# Patient Record
Sex: FEMALE | Race: WHITE | Employment: OTHER | URBAN - NONMETROPOLITAN AREA
[De-identification: names, ages, dates, MRNs, and addresses within clinical notes are randomized per-mention and may not be internally consistent; named-entity substitution may affect disease eponyms.]

---

## 2017-09-27 ENCOUNTER — HOSPITAL ENCOUNTER (OUTPATIENT)
Dept: WOMENS IMAGING | Age: 67
Discharge: HOME | End: 2017-09-27
Payer: MEDICARE

## 2017-09-27 DIAGNOSIS — Z12.39 SCREENING BREAST EXAMINATION: ICD-10-CM

## 2017-09-27 PROCEDURE — G0202 SCR MAMMO BI INCL CAD: HCPCS

## 2018-07-29 PROBLEM — H25.012 CORTICAL AGE-RELATED CATARACT, LEFT EYE: Chronic | Status: ACTIVE | Noted: 2018-07-29

## 2018-07-30 ENCOUNTER — HOSPITAL ENCOUNTER (OUTPATIENT)
Age: 68
Setting detail: OUTPATIENT SURGERY
Discharge: HOME | End: 2018-07-30
Attending: OPHTHALMOLOGY | Admitting: OPHTHALMOLOGY
Payer: MEDICARE

## 2018-07-30 VITALS
DIASTOLIC BLOOD PRESSURE: 76 MMHG | HEART RATE: 66 BPM | RESPIRATION RATE: 20 BRPM | BODY MASS INDEX: 33.97 KG/M2 | TEMPERATURE: 97 F | HEIGHT: 64 IN | SYSTOLIC BLOOD PRESSURE: 150 MMHG | WEIGHT: 199 LBS | OXYGEN SATURATION: 98 %

## 2018-07-30 PROBLEM — H25.012 CORTICAL AGE-RELATED CATARACT, LEFT EYE: Chronic | Status: RESOLVED | Noted: 2018-07-29 | Resolved: 2018-07-30

## 2018-07-30 PROCEDURE — 2580000003 HC RX 258: Performed by: OPHTHALMOLOGY

## 2018-07-30 PROCEDURE — 99152 MOD SED SAME PHYS/QHP 5/>YRS: CPT | Performed by: OPHTHALMOLOGY

## 2018-07-30 PROCEDURE — 6370000000 HC RX 637 (ALT 250 FOR IP): Performed by: OPHTHALMOLOGY

## 2018-07-30 PROCEDURE — 3600000003 HC SURGERY LEVEL 3 BASE: Performed by: OPHTHALMOLOGY

## 2018-07-30 PROCEDURE — 6360000002 HC RX W HCPCS

## 2018-07-30 PROCEDURE — 7100000010 HC PHASE II RECOVERY - FIRST 15 MIN: Performed by: OPHTHALMOLOGY

## 2018-07-30 PROCEDURE — 6360000002 HC RX W HCPCS: Performed by: OPHTHALMOLOGY

## 2018-07-30 PROCEDURE — V2632 POST CHMBR INTRAOCULAR LENS: HCPCS | Performed by: OPHTHALMOLOGY

## 2018-07-30 PROCEDURE — 2500000003 HC RX 250 WO HCPCS: Performed by: OPHTHALMOLOGY

## 2018-07-30 PROCEDURE — 99153 MOD SED SAME PHYS/QHP EA: CPT | Performed by: OPHTHALMOLOGY

## 2018-07-30 PROCEDURE — 2709999900 HC NON-CHARGEABLE SUPPLY: Performed by: OPHTHALMOLOGY

## 2018-07-30 PROCEDURE — 2500000003 HC RX 250 WO HCPCS

## 2018-07-30 PROCEDURE — 3600000013 HC SURGERY LEVEL 3 ADDTL 15MIN: Performed by: OPHTHALMOLOGY

## 2018-07-30 DEVICE — LENS INTOCU +20 DIOPT L12.5MM DIA6MM 119.1 OPTICAL/118.7 A: Type: IMPLANTABLE DEVICE | Site: EYE | Status: FUNCTIONAL

## 2018-07-30 RX ORDER — ACETAMINOPHEN 325 MG/1
650 TABLET ORAL EVERY 4 HOURS PRN
Status: CANCELLED | OUTPATIENT
Start: 2018-07-30

## 2018-07-30 RX ORDER — SODIUM CHLORIDE 0.9 % (FLUSH) 0.9 %
10 SYRINGE (ML) INJECTION EVERY 12 HOURS SCHEDULED
Status: CANCELLED | OUTPATIENT
Start: 2018-07-30

## 2018-07-30 RX ORDER — PHENYLEPHRINE HCL 2.5 %
1 DROPS OPHTHALMIC (EYE) SEE ADMIN INSTRUCTIONS
Status: DISCONTINUED | OUTPATIENT
Start: 2018-07-30 | End: 2018-07-30 | Stop reason: HOSPADM

## 2018-07-30 RX ORDER — SODIUM CHLORIDE 0.9 % (FLUSH) 0.9 %
10 SYRINGE (ML) INJECTION EVERY 12 HOURS SCHEDULED
Status: DISCONTINUED | OUTPATIENT
Start: 2018-07-30 | End: 2018-07-30 | Stop reason: HOSPADM

## 2018-07-30 RX ORDER — FENTANYL CITRATE 50 UG/ML
INJECTION, SOLUTION INTRAMUSCULAR; INTRAVENOUS PRN
Status: DISCONTINUED | OUTPATIENT
Start: 2018-07-30 | End: 2018-07-30 | Stop reason: HOSPADM

## 2018-07-30 RX ORDER — PROPARACAINE HYDROCHLORIDE 5 MG/ML
1 SOLUTION/ DROPS OPHTHALMIC SEE ADMIN INSTRUCTIONS
Status: DISCONTINUED | OUTPATIENT
Start: 2018-07-30 | End: 2018-07-30 | Stop reason: HOSPADM

## 2018-07-30 RX ORDER — ONDANSETRON 2 MG/ML
4 INJECTION INTRAMUSCULAR; INTRAVENOUS EVERY 6 HOURS PRN
Status: CANCELLED | OUTPATIENT
Start: 2018-07-30

## 2018-07-30 RX ORDER — LIDOCAINE HYDROCHLORIDE 10 MG/ML
INJECTION, SOLUTION EPIDURAL; INFILTRATION; INTRACAUDAL; PERINEURAL PRN
Status: DISCONTINUED | OUTPATIENT
Start: 2018-07-30 | End: 2018-07-30 | Stop reason: HOSPADM

## 2018-07-30 RX ORDER — ATORVASTATIN CALCIUM 20 MG/1
20 TABLET, FILM COATED ORAL DAILY
COMMUNITY

## 2018-07-30 RX ORDER — SODIUM CHLORIDE 0.9 % (FLUSH) 0.9 %
10 SYRINGE (ML) INJECTION PRN
Status: DISCONTINUED | OUTPATIENT
Start: 2018-07-30 | End: 2018-07-30 | Stop reason: HOSPADM

## 2018-07-30 RX ORDER — SODIUM CHLORIDE 9 MG/ML
INJECTION, SOLUTION INTRAVENOUS CONTINUOUS
Status: DISCONTINUED | OUTPATIENT
Start: 2018-07-30 | End: 2018-07-30 | Stop reason: HOSPADM

## 2018-07-30 RX ORDER — TETRACAINE HYDROCHLORIDE 5 MG/ML
1 SOLUTION OPHTHALMIC SEE ADMIN INSTRUCTIONS
Status: DISCONTINUED | OUTPATIENT
Start: 2018-07-30 | End: 2018-07-30 | Stop reason: HOSPADM

## 2018-07-30 RX ORDER — SODIUM CHLORIDE 0.9 % (FLUSH) 0.9 %
10 SYRINGE (ML) INJECTION PRN
Status: CANCELLED | OUTPATIENT
Start: 2018-07-30

## 2018-07-30 RX ORDER — TROPICAMIDE 10 MG/ML
1 SOLUTION/ DROPS OPHTHALMIC SEE ADMIN INSTRUCTIONS
Status: DISCONTINUED | OUTPATIENT
Start: 2018-07-30 | End: 2018-07-30 | Stop reason: HOSPADM

## 2018-07-30 RX ORDER — MIDAZOLAM HYDROCHLORIDE 1 MG/ML
INJECTION INTRAMUSCULAR; INTRAVENOUS PRN
Status: DISCONTINUED | OUTPATIENT
Start: 2018-07-30 | End: 2018-07-30 | Stop reason: HOSPADM

## 2018-07-30 RX ADMIN — SODIUM CHLORIDE: 9 INJECTION, SOLUTION INTRAVENOUS at 14:13

## 2018-07-30 RX ADMIN — PROPARACAINE HYDROCHLORIDE 1 DROP: 5 SOLUTION/ DROPS OPHTHALMIC at 14:20

## 2018-07-30 RX ADMIN — TROPICAMIDE 1 DROP: 10 SOLUTION/ DROPS OPHTHALMIC at 14:10

## 2018-07-30 RX ADMIN — TROPICAMIDE 1 DROP: 10 SOLUTION/ DROPS OPHTHALMIC at 14:00

## 2018-07-30 RX ADMIN — PHENYLEPHRINE HYDROCHLORIDE 1 DROP: 25 SOLUTION/ DROPS OPHTHALMIC at 14:20

## 2018-07-30 RX ADMIN — PROPARACAINE HYDROCHLORIDE 1 DROP: 5 SOLUTION/ DROPS OPHTHALMIC at 14:00

## 2018-07-30 RX ADMIN — PROPARACAINE HYDROCHLORIDE 1 DROP: 5 SOLUTION/ DROPS OPHTHALMIC at 14:10

## 2018-07-30 RX ADMIN — TROPICAMIDE 1 DROP: 10 SOLUTION/ DROPS OPHTHALMIC at 14:20

## 2018-07-30 RX ADMIN — PHENYLEPHRINE HYDROCHLORIDE 1 DROP: 25 SOLUTION/ DROPS OPHTHALMIC at 14:00

## 2018-07-30 RX ADMIN — PHENYLEPHRINE HYDROCHLORIDE 1 DROP: 25 SOLUTION/ DROPS OPHTHALMIC at 14:10

## 2018-07-30 RX ADMIN — TETRACAINE HYDROCHLORIDE 1 DROP: 25 LIQUID OPHTHALMIC at 14:35

## 2018-07-30 ASSESSMENT — PAIN - FUNCTIONAL ASSESSMENT: PAIN_FUNCTIONAL_ASSESSMENT: 0-10

## 2018-07-30 ASSESSMENT — PAIN SCALES - GENERAL
PAINLEVEL_OUTOF10: 0
PAINLEVEL_OUTOF10: 0

## 2018-07-30 NOTE — OP NOTE
OPERATIVE NOTE      Patient:  Mary Bermudez:  [birth year]    Account #:  [de-identified]    Date:  7/30/2018    Surgeon:  Carmelo Ramírez. 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 year old with decreased vision, glare and halos around lights, and trouble with activities of daily living. Examination revealed a visually significant cataract in the left eye. Other eye diseases have been ruled out as the primary cause of decreased visual function. Significant improvement is expected in the patient's visual acuity and/or visual function from the removal of the cataract. Risks, benefits, and alternatives to surgery were discussed with the patient and the patient elected to proceed with phacoemulsification with lens implantation. Details of the procedure: Following informed consent, the patient was identified by name and identification tag in the holding area,taken to the operating room and placed in the supine position. A time out was performed by all present in the room to identify the patient, the eye to be operated on, the correct operative procedure, and the correct intraocular lens. Monitoring leads were placed. The patient was positioned. An eyelid prep of half-strength Betadine was performed. The patient was administered intravenous sedation if desired and topical anesthetic was placed in the operative eye. The eye prepped a second time and draped in the usual sterile fashion using aseptic technique for cataract surgery. A lid speculum was then inserted. [place] was placed onto the corneal surface.   A stab incision was made using a disposable blade into the anterior chamber. Intracameral preservative free xylocaine was placed into the anterior chamber. Amvisc was then used to replace the aqueous of the anterior chamber. A 2.2 mm keratome blade was used to make a 3-plane clear corneal incision into cornea depending on the patient's astigmatism. The cystitome was used to make a tear in the anterior capsule. Fine forceps were used to make a complete curvilinear capsulorrhexis. The lens was hydrodissected and freely rotated using a balanced salt filled syringe. The ultrasound handpiece was then used to emulsify the nucleus and epi nucleus. The residual cortex was then aspirated using the IA handpiece. The posterior capsule was polished. The eye was filled with viscoelastic and a foldable posterior chamber intraocular lens was injected into the capsular bag unless otherwise stated in the addendum. Irrigation/aspiration was used to remove all excess viscoelastic. The eye was pressurized and the wounds were check for leaks and none were found. A collagen shield, which had been soaked in antibiotic drops, was then placed onto the cornea. The lid speculum was removed. The eye was covered with a clear plastic shield. The patient was taken to the recovery area in excellent condition, having tolerated the procedure well, and will follow up with me tomorrow for postop care. ADDENDUM:  The phacoemulsification time 20.57 seconds @ 8% average ultrasound power for an effective phacoemulsification time of 1.74 seconds. The lens inserted was a model MX60 made by [facility] [employer] that was +20.0 diopters in power. The lens was inserted into the capsular bag utilizing the BLIS-X1 injector made by [employer] [employer].  The serial number on this lens was [id number]. There was no stitch placed to close this temporal posterior corneal incision. EBL was less than 1.0 ml. Carmelo Ramírez.  Cade Loving DO

## 2018-08-19 PROBLEM — H25.011 CORTICAL AGE-RELATED CATARACT OF RIGHT EYE: Chronic | Status: ACTIVE | Noted: 2018-08-19

## 2018-08-20 ENCOUNTER — HOSPITAL ENCOUNTER (OUTPATIENT)
Age: 68
Setting detail: OUTPATIENT SURGERY
Discharge: HOME | End: 2018-08-20
Attending: OPHTHALMOLOGY | Admitting: OPHTHALMOLOGY
Payer: MEDICARE

## 2018-08-20 VITALS
DIASTOLIC BLOOD PRESSURE: 66 MMHG | TEMPERATURE: 97 F | HEART RATE: 69 BPM | BODY MASS INDEX: 34.15 KG/M2 | SYSTOLIC BLOOD PRESSURE: 157 MMHG | WEIGHT: 200 LBS | HEIGHT: 64 IN | OXYGEN SATURATION: 99 % | RESPIRATION RATE: 18 BRPM

## 2018-08-20 PROBLEM — H25.011 CORTICAL AGE-RELATED CATARACT OF RIGHT EYE: Chronic | Status: RESOLVED | Noted: 2018-08-19 | Resolved: 2018-08-20

## 2018-08-20 PROCEDURE — 2709999900 HC NON-CHARGEABLE SUPPLY: Performed by: OPHTHALMOLOGY

## 2018-08-20 PROCEDURE — 6360000002 HC RX W HCPCS: Performed by: OPHTHALMOLOGY

## 2018-08-20 PROCEDURE — V2632 POST CHMBR INTRAOCULAR LENS: HCPCS | Performed by: OPHTHALMOLOGY

## 2018-08-20 PROCEDURE — 3600000003 HC SURGERY LEVEL 3 BASE: Performed by: OPHTHALMOLOGY

## 2018-08-20 PROCEDURE — 6370000000 HC RX 637 (ALT 250 FOR IP): Performed by: OPHTHALMOLOGY

## 2018-08-20 PROCEDURE — 2500000003 HC RX 250 WO HCPCS: Performed by: OPHTHALMOLOGY

## 2018-08-20 PROCEDURE — 2580000003 HC RX 258: Performed by: OPHTHALMOLOGY

## 2018-08-20 PROCEDURE — 99153 MOD SED SAME PHYS/QHP EA: CPT | Performed by: OPHTHALMOLOGY

## 2018-08-20 PROCEDURE — 3600000013 HC SURGERY LEVEL 3 ADDTL 15MIN: Performed by: OPHTHALMOLOGY

## 2018-08-20 PROCEDURE — 99152 MOD SED SAME PHYS/QHP 5/>YRS: CPT | Performed by: OPHTHALMOLOGY

## 2018-08-20 PROCEDURE — 7100000011 HC PHASE II RECOVERY - ADDTL 15 MIN: Performed by: OPHTHALMOLOGY

## 2018-08-20 PROCEDURE — 7100000010 HC PHASE II RECOVERY - FIRST 15 MIN: Performed by: OPHTHALMOLOGY

## 2018-08-20 DEVICE — LENS INTRAOCULAR BCNVX 16.5+ DIOPT 6X12.5 MM ACRYL ENVISTA: Type: IMPLANTABLE DEVICE | Site: EYE | Status: FUNCTIONAL

## 2018-08-20 RX ORDER — ONDANSETRON 2 MG/ML
4 INJECTION INTRAMUSCULAR; INTRAVENOUS EVERY 6 HOURS PRN
Status: DISCONTINUED | OUTPATIENT
Start: 2018-08-20 | End: 2018-08-20 | Stop reason: HOSPADM

## 2018-08-20 RX ORDER — ACETAMINOPHEN 325 MG/1
650 TABLET ORAL EVERY 4 HOURS PRN
Status: DISCONTINUED | OUTPATIENT
Start: 2018-08-20 | End: 2018-08-20 | Stop reason: HOSPADM

## 2018-08-20 RX ORDER — SODIUM CHLORIDE 0.9 % (FLUSH) 0.9 %
10 SYRINGE (ML) INJECTION EVERY 12 HOURS SCHEDULED
Status: DISCONTINUED | OUTPATIENT
Start: 2018-08-20 | End: 2018-08-20 | Stop reason: HOSPADM

## 2018-08-20 RX ORDER — TROPICAMIDE 10 MG/ML
1 SOLUTION/ DROPS OPHTHALMIC SEE ADMIN INSTRUCTIONS
Status: DISCONTINUED | OUTPATIENT
Start: 2018-08-20 | End: 2018-08-20 | Stop reason: HOSPADM

## 2018-08-20 RX ORDER — TETRACAINE HYDROCHLORIDE 5 MG/ML
SOLUTION OPHTHALMIC PRN
Status: DISCONTINUED | OUTPATIENT
Start: 2018-08-20 | End: 2018-08-20 | Stop reason: HOSPADM

## 2018-08-20 RX ORDER — TETRACAINE HYDROCHLORIDE 5 MG/ML
1 SOLUTION OPHTHALMIC SEE ADMIN INSTRUCTIONS
Status: DISCONTINUED | OUTPATIENT
Start: 2018-08-20 | End: 2018-08-20 | Stop reason: HOSPADM

## 2018-08-20 RX ORDER — SODIUM CHLORIDE 0.9 % (FLUSH) 0.9 %
10 SYRINGE (ML) INJECTION PRN
Status: DISCONTINUED | OUTPATIENT
Start: 2018-08-20 | End: 2018-08-20 | Stop reason: HOSPADM

## 2018-08-20 RX ORDER — PROPARACAINE HYDROCHLORIDE 5 MG/ML
1 SOLUTION/ DROPS OPHTHALMIC SEE ADMIN INSTRUCTIONS
Status: DISCONTINUED | OUTPATIENT
Start: 2018-08-20 | End: 2018-08-20 | Stop reason: HOSPADM

## 2018-08-20 RX ORDER — MIDAZOLAM HYDROCHLORIDE 1 MG/ML
INJECTION INTRAMUSCULAR; INTRAVENOUS PRN
Status: DISCONTINUED | OUTPATIENT
Start: 2018-08-20 | End: 2018-08-20 | Stop reason: HOSPADM

## 2018-08-20 RX ORDER — SODIUM CHLORIDE 9 MG/ML
INJECTION, SOLUTION INTRAVENOUS CONTINUOUS
Status: DISCONTINUED | OUTPATIENT
Start: 2018-08-20 | End: 2018-08-20 | Stop reason: HOSPADM

## 2018-08-20 RX ORDER — FENTANYL CITRATE 50 UG/ML
INJECTION, SOLUTION INTRAMUSCULAR; INTRAVENOUS PRN
Status: DISCONTINUED | OUTPATIENT
Start: 2018-08-20 | End: 2018-08-20 | Stop reason: HOSPADM

## 2018-08-20 RX ORDER — PHENYLEPHRINE HCL 2.5 %
1 DROPS OPHTHALMIC (EYE) SEE ADMIN INSTRUCTIONS
Status: DISCONTINUED | OUTPATIENT
Start: 2018-08-20 | End: 2018-08-20 | Stop reason: HOSPADM

## 2018-08-20 RX ORDER — LIDOCAINE HYDROCHLORIDE 10 MG/ML
INJECTION, SOLUTION EPIDURAL; INFILTRATION; INTRACAUDAL; PERINEURAL PRN
Status: DISCONTINUED | OUTPATIENT
Start: 2018-08-20 | End: 2018-08-20 | Stop reason: HOSPADM

## 2018-08-20 RX ADMIN — PHENYLEPHRINE HYDROCHLORIDE 1 DROP: 25 SOLUTION/ DROPS OPHTHALMIC at 13:15

## 2018-08-20 RX ADMIN — PROPARACAINE HYDROCHLORIDE 1 DROP: 5 SOLUTION/ DROPS OPHTHALMIC at 13:15

## 2018-08-20 RX ADMIN — TROPICAMIDE 1 DROP: 10 SOLUTION/ DROPS OPHTHALMIC at 13:15

## 2018-08-20 RX ADMIN — TROPICAMIDE 1 DROP: 10 SOLUTION/ DROPS OPHTHALMIC at 13:25

## 2018-08-20 RX ADMIN — PROPARACAINE HYDROCHLORIDE 1 DROP: 5 SOLUTION/ DROPS OPHTHALMIC at 13:25

## 2018-08-20 RX ADMIN — PHENYLEPHRINE HYDROCHLORIDE 1 DROP: 25 SOLUTION/ DROPS OPHTHALMIC at 13:25

## 2018-08-20 RX ADMIN — TETRACAINE HYDROCHLORIDE 1 DROP: 25 LIQUID OPHTHALMIC at 13:38

## 2018-08-20 RX ADMIN — SODIUM CHLORIDE: 9 INJECTION, SOLUTION INTRAVENOUS at 13:22

## 2018-08-20 RX ADMIN — PHENYLEPHRINE HYDROCHLORIDE 1 DROP: 25 SOLUTION/ DROPS OPHTHALMIC at 13:20

## 2018-08-20 RX ADMIN — TROPICAMIDE 1 DROP: 10 SOLUTION/ DROPS OPHTHALMIC at 13:20

## 2018-08-20 RX ADMIN — PROPARACAINE HYDROCHLORIDE 1 DROP: 5 SOLUTION/ DROPS OPHTHALMIC at 13:20

## 2018-08-20 ASSESSMENT — PAIN SCALES - GENERAL
PAINLEVEL_OUTOF10: 0

## 2018-08-20 ASSESSMENT — PAIN - FUNCTIONAL ASSESSMENT: PAIN_FUNCTIONAL_ASSESSMENT: 0-10

## 2018-08-20 NOTE — H&P
I have examined the patient and reviewed the history and physical completed on 8/19/18 and find no relevant changes. I have reviewed with the patient and/or family the risks, benefits, and alternatives to the procedure and they have agreed to proceed.     Franky Adams  8/20/2018  12:12 PM

## 2018-08-20 NOTE — H&P
Stanislaw Lopes is a 69y.o. year old who presents for elective outpatient ophthalmic surgery with Daniel Brink. The patient complains of decreased vision, glare and halos around lights, and trouble with vision for activities of daily living. Past Medical History:   Diagnosis Date    High cholesterol     High triglycerides        Past Surgical History:   Procedure Laterality Date    COLONOSCOPY  2009    HYSTERECTOMY      Vaginal    HYSTEROSCOPY      OK REMV CATARACT EXTRACAP,INSERT LENS Left 7/30/2018    EYE CATARACT EMULSIFICATION IOL IMPLANT performed by Daniel Brink DO at [facility] meds:   Prior to Admission medications    Medication Sig Start Date End Date Taking? Authorizing Provider   atorvastatin (LIPITOR) 20 MG tablet Take 20 mg by mouth daily    Historical Provider, MD   vitamin E 400 UNIT capsule Take 400 Units by mouth daily. Historical Provider, MD   Cholecalciferol (VITAMIN D3) 2000 UNITS CAPS Take  by mouth. Historical Provider, MD   Omega-3 Fatty Acids (OMEGA 3 PO) Take  by mouth. Historical Provider, MD   Biotin 5000 MCG CAPS Take  by mouth. Historical Provider, MD   omeprazole (PRILOSEC) 10 MG capsule Take 10 mg by mouth daily. Historical Provider, MD   lovastatin (MEVACOR) 40 MG tablet Take 40 mg by mouth nightly. Historical Provider, MD   aspirin 81 MG EC tablet Take 81 mg by mouth daily. Historical Provider, MD   therapeutic multivitamin-minerals (THERAGRAN-M) tablet Take 1 tablet by mouth daily. Historical Provider, MD   Calcium-Vitamin D (CALTRATE 600 PLUS-VIT D PO) Take 600 mg by mouth daily. Historical Provider, MD     Scheduled Meds:  Continuous Infusions:  PRN Meds:. No Known Allergies    There were no vitals filed for this visit.     PHYSICAL EXAMINATION    Gen: NAD  HEENT: BCVA= 20/40, Glare testing= 20/60, Cataract severity/type-2+ Cortical Age Related

## 2018-08-20 NOTE — OP NOTE
disposable blade into the anterior chamber. Intracameral preservative free xylocaine was placed into the anterior chamber. Amvisc was then used to replace the aqueous of the anterior chamber. A 2.2 mm keratome blade was used to make a 3-plane clear corneal incision into cornea depending on the patient's astigmatism. The cystitome was used to make a tear in the anterior capsule. Fine forceps were used to make a complete curvilinear capsulorrhexis. The lens was hydrodissected and freely rotated using a balanced salt filled syringe. The ultrasound handpiece was then used to emulsify the nucleus and epi nucleus. The residual cortex was then aspirated using the IA handpiece. The posterior capsule was polished. The eye was filled with viscoelastic and a foldable posterior chamber intraocular lens was injected into the capsular bag unless otherwise stated in the addendum. Irrigation/aspiration was used to remove all excess viscoelastic. The eye was pressurized and the wounds were check for leaks and none were found. A collagen shield, which had been soaked in antibiotic drops, was then placed onto the cornea. The lid speculum was removed. The eye was covered with a clear plastic shield. The patient was taken to the recovery area in excellent condition, having tolerated the procedure well, and will follow up with me tomorrow for postop care. ADDENDUM:  The phacoemulsification time 21.39 seconds @ 13% average ultrasound power for an effective phacoemulsification time of 2.84 seconds. The lens inserted was a model MX60 made by [facility] [employer] that was +16.5 diopters in power. The lens was inserted into the capsular bag utilizing the BLIS-X1 injector made by [employer] [facility] [employer].  The serial number on this lens was [id number]. There was no stitch placed to close this temporal posterior corneal incision. EBL was less than 1.0 ml. Pat Xavier.  Anjali Hinson DO Left arm;

## 2018-08-20 NOTE — PROGRESS NOTES
Discharge instructions reviewed with patient and spouse. Both verbalize understanding and deny questions. Discharge Criteria    Inpatients must meet Criteria 1 through 7. All other patients are either YES or N/A. If a NO is chosen then Anesthesia or Surgeon must be notified. 1.  Minimum 30 minutes after last dose of sedative medication, minimum 120 minutes after last dose of reversal agent. Yes      2. Systolic BP stable within 20 mmHg for 30 minutes & systolic BP between 90 & 258 or within 10 mmHg of baseline. Yes      3. Pulse between 60 and 100 or within 10 bpm of baseline. Yes      4. Spontaneous respiratory rate >/= 10 per minute. Yes      5. SaO2 >/= 95 or  >/= baseline. Yes      6. Able to cough and swallow or return to baseline function. Yes      7. Alert and oriented or return to baseline mental status. Yes      8. Demonstrates controlled, coordinated movements, ambulates with steady gait, or return to baseline activity function. Yes      9. Minimal or no pain or nausea, or at a level tolerable and acceptable to patient. Yes      10. Takes and retains oral fluids as allowed. Yes      11. Procedural / perioperative site stable. Minimal or no bleeding. Yes          12. If GI endoscopy procedure, minimal or no abdominal distention or passing flatus. N/A      13. Written discharge instructions and emergency telephone number provided. Yes      14. Accompanied by a responsible adult. Yes      Adult patient discharged from facility without responsible person meets above criteria plus the following:   a) remains awake without stimulus for 30 minutes     b) oriented appropriate for age      c) all vital signs stable   d) no significant risk of losing protective reflexes      e) able to maintain pre-procedure mobility without assistance   f) no nausea or dizziness      g) transportation arrangements that do not require patient to operate motor Vehicle. N/A

## 2018-08-30 ENCOUNTER — OFFICE VISIT (OUTPATIENT)
Dept: OBGYN | Age: 68
End: 2018-08-30
Payer: MEDICARE

## 2018-08-30 ENCOUNTER — HOSPITAL ENCOUNTER (OUTPATIENT)
Age: 68
Setting detail: SPECIMEN
Discharge: HOME | End: 2018-08-30
Payer: MEDICARE

## 2018-08-30 VITALS
WEIGHT: 202 LBS | HEIGHT: 64 IN | DIASTOLIC BLOOD PRESSURE: 100 MMHG | BODY MASS INDEX: 34.49 KG/M2 | SYSTOLIC BLOOD PRESSURE: 160 MMHG

## 2018-08-30 DIAGNOSIS — Z01.419 ENCOUNTER FOR ROUTINE GYNECOLOGIC EXAMINATION IN MEDICARE PATIENT: Primary | ICD-10-CM

## 2018-08-30 DIAGNOSIS — Z12.39 SCREENING FOR BREAST CANCER: ICD-10-CM

## 2018-08-30 DIAGNOSIS — Z01.419 ENCOUNTER FOR ROUTINE GYNECOLOGIC EXAMINATION IN MEDICARE PATIENT: ICD-10-CM

## 2018-08-30 DIAGNOSIS — N90.89 VULVAL LESION: ICD-10-CM

## 2018-08-30 PROCEDURE — 88312 SPECIAL STAINS GROUP 1: CPT

## 2018-08-30 PROCEDURE — 88305 TISSUE EXAM BY PATHOLOGIST: CPT

## 2018-08-30 PROCEDURE — G0145 SCR C/V CYTO,THINLAYER,RESCR: HCPCS

## 2018-08-30 PROCEDURE — G0101 CA SCREEN;PELVIC/BREAST EXAM: HCPCS | Performed by: OBSTETRICS & GYNECOLOGY

## 2018-08-30 PROCEDURE — 56605 BIOPSY OF VULVA/PERINEUM: CPT | Performed by: OBSTETRICS & GYNECOLOGY

## 2018-08-30 RX ORDER — PREDNISOLONE ACETATE 10 MG/ML
SUSPENSION/ DROPS OPHTHALMIC
COMMUNITY
Start: 2018-08-16 | End: 2021-09-27

## 2018-08-30 ASSESSMENT — PATIENT HEALTH QUESTIONNAIRE - PHQ9
SUM OF ALL RESPONSES TO PHQ QUESTIONS 1-9: 0
2. FEELING DOWN, DEPRESSED OR HOPELESS: 0
1. LITTLE INTEREST OR PLEASURE IN DOING THINGS: 0
SUM OF ALL RESPONSES TO PHQ9 QUESTIONS 1 & 2: 0
SUM OF ALL RESPONSES TO PHQ QUESTIONS 1-9: 0

## 2018-08-30 NOTE — PROGRESS NOTES
MEDICARE PHYSICAL    Date of service: 2018    Solomon Tsai  Is a 69 y.o.  female    PT's PCP is: John Mccain MD     : 1950     HIGH RISK ASSESSMENT    Maternal INEZ Exposure (in utero): No    Sexual activity < 16 yrs of age: No    Greater than 5 sexual partners: No    3 abnormal paps in the last 7 years: No    History of any STD (including HIV): No                                        Subjective:       No LMP recorded. Patient has had a hysterectomy. Are your menses regular: not applicable    OB History    Para Term  AB Living   1 1 1         SAB TAB Ectopic Molar Multiple Live Births                    # Outcome Date GA Lbr Lopez/2nd Weight Sex Delivery Anes PTL Lv   1 Term 26 43w0d   F Vag-Spont              History   Smoking Status    Former Smoker    Types: Cigarettes   Smokeless Tobacco    Never Used        History   Alcohol Use    Yes     Comment: occ       Allergies: Patient has no known allergies. Current Outpatient Prescriptions:     prednisoLONE acetate (PRED FORTE) 1 % ophthalmic suspension, , Disp: , Rfl:     atorvastatin (LIPITOR) 20 MG tablet, Take 20 mg by mouth daily, Disp: , Rfl:     vitamin E 400 UNIT capsule, Take 400 Units by mouth daily. , Disp: , Rfl:     Cholecalciferol (VITAMIN D3) 2000 UNITS CAPS, Take  by mouth., Disp: , Rfl:     Omega-3 Fatty Acids (OMEGA 3 PO), Take  by mouth., Disp: , Rfl:     Biotin 5000 MCG CAPS, Take  by mouth., Disp: , Rfl:     omeprazole (PRILOSEC) 10 MG capsule, Take 10 mg by mouth daily. , Disp: , Rfl:     aspirin 81 MG EC tablet, Take 81 mg by mouth daily. , Disp: , Rfl:     therapeutic multivitamin-minerals (THERAGRAN-M) tablet, Take 1 tablet by mouth daily. , Disp: , Rfl:     Calcium-Vitamin D (CALTRATE 600 PLUS-VIT D PO), Take 600 mg by mouth daily.   , Disp: , Rfl:     lovastatin (MEVACOR) 40 MG tablet, Take 40 mg by mouth nightly., Disp: , Rfl:     History   Sexual Activity    Sexual activity:

## 2018-09-05 LAB — SURGICAL PATHOLOGY REPORT: NORMAL

## 2018-09-14 ENCOUNTER — OFFICE VISIT (OUTPATIENT)
Dept: OBGYN | Age: 68
End: 2018-09-14
Payer: MEDICARE

## 2018-09-14 VITALS
HEIGHT: 64 IN | BODY MASS INDEX: 34.49 KG/M2 | SYSTOLIC BLOOD PRESSURE: 160 MMHG | WEIGHT: 202 LBS | DIASTOLIC BLOOD PRESSURE: 100 MMHG

## 2018-09-14 DIAGNOSIS — N90.4 LICHEN SCLEROSUS ET ATROPHICUS OF THE VULVA: Primary | ICD-10-CM

## 2018-09-14 PROCEDURE — 1123F ACP DISCUSS/DSCN MKR DOCD: CPT | Performed by: OBSTETRICS & GYNECOLOGY

## 2018-09-14 PROCEDURE — 4040F PNEUMOC VAC/ADMIN/RCVD: CPT | Performed by: OBSTETRICS & GYNECOLOGY

## 2018-09-14 PROCEDURE — 1036F TOBACCO NON-USER: CPT | Performed by: OBSTETRICS & GYNECOLOGY

## 2018-09-14 PROCEDURE — 99212 OFFICE O/P EST SF 10 MIN: CPT | Performed by: OBSTETRICS & GYNECOLOGY

## 2018-09-14 PROCEDURE — G8427 DOCREV CUR MEDS BY ELIG CLIN: HCPCS | Performed by: OBSTETRICS & GYNECOLOGY

## 2018-09-14 PROCEDURE — G8399 PT W/DXA RESULTS DOCUMENT: HCPCS | Performed by: OBSTETRICS & GYNECOLOGY

## 2018-09-14 PROCEDURE — 1101F PT FALLS ASSESS-DOCD LE1/YR: CPT | Performed by: OBSTETRICS & GYNECOLOGY

## 2018-09-14 PROCEDURE — 1090F PRES/ABSN URINE INCON ASSESS: CPT | Performed by: OBSTETRICS & GYNECOLOGY

## 2018-09-14 PROCEDURE — 3017F COLORECTAL CA SCREEN DOC REV: CPT | Performed by: OBSTETRICS & GYNECOLOGY

## 2018-09-14 PROCEDURE — G8417 CALC BMI ABV UP PARAM F/U: HCPCS | Performed by: OBSTETRICS & GYNECOLOGY

## 2018-09-17 LAB — CYTOLOGY REPORT: NORMAL

## 2018-09-28 ENCOUNTER — HOSPITAL ENCOUNTER (OUTPATIENT)
Dept: WOMENS IMAGING | Age: 68
Discharge: HOME | End: 2018-09-30
Payer: MEDICARE

## 2018-09-28 DIAGNOSIS — Z12.39 SCREENING FOR BREAST CANCER: ICD-10-CM

## 2018-09-28 PROCEDURE — 77067 SCR MAMMO BI INCL CAD: CPT

## 2019-08-31 NOTE — H&P
Problem: Physical Therapy Goal  Goal: Physical Therapy Goal  Goals to be met by: D/C     Patient will increase functional independence with mobility by performin. Supine to sit with Stand-by Assistance  2. Sit to stand transfer with Stand-by Assistance  3. Gait  x 50 feet with Supervision using Rolling Walker or cane.   4. Ascend and descend 3 stairs to allow safe return home       Outcome: Ongoing (interventions implemented as appropriate)  Patient participated in gait training and therapeutic activities to improve and strength functional mobility and improve ADL's to reduce fall risk.       benefits, alternatives to surgery discussed with the patient and family. 2. All questions were answered to their satisfaction. 3. Ok to proceed with surgery as planned.     Jose Max, DO

## 2019-10-02 ENCOUNTER — HOSPITAL ENCOUNTER (OUTPATIENT)
Dept: WOMENS IMAGING | Age: 69
Discharge: HOME | End: 2019-10-04
Payer: MEDICARE

## 2019-10-02 DIAGNOSIS — Z12.31 BREAST CANCER SCREENING BY MAMMOGRAM: ICD-10-CM

## 2019-10-02 PROCEDURE — 77063 BREAST TOMOSYNTHESIS BI: CPT

## 2020-08-19 ENCOUNTER — APPOINTMENT (OUTPATIENT)
Dept: CT IMAGING | Age: 70
DRG: 660 | End: 2020-08-19
Payer: MEDICARE

## 2020-08-19 ENCOUNTER — TELEPHONE (OUTPATIENT)
Dept: UROLOGY | Age: 70
End: 2020-08-19

## 2020-08-19 ENCOUNTER — OFFICE VISIT (OUTPATIENT)
Dept: UROLOGY | Age: 70
DRG: 660 | End: 2020-08-19
Payer: MEDICARE

## 2020-08-19 ENCOUNTER — HOSPITAL ENCOUNTER (EMERGENCY)
Age: 70
Discharge: HOME | DRG: 660 | End: 2020-08-19
Attending: EMERGENCY MEDICINE
Payer: MEDICARE

## 2020-08-19 VITALS
WEIGHT: 208 LBS | HEART RATE: 76 BPM | SYSTOLIC BLOOD PRESSURE: 160 MMHG | TEMPERATURE: 97.1 F | BODY MASS INDEX: 35.51 KG/M2 | DIASTOLIC BLOOD PRESSURE: 74 MMHG | HEIGHT: 64 IN

## 2020-08-19 VITALS
OXYGEN SATURATION: 99 % | HEART RATE: 81 BPM | SYSTOLIC BLOOD PRESSURE: 156 MMHG | TEMPERATURE: 97.3 F | DIASTOLIC BLOOD PRESSURE: 45 MMHG | RESPIRATION RATE: 24 BRPM

## 2020-08-19 PROBLEM — N20.0 RENAL CALCULUS: Status: ACTIVE | Noted: 2020-08-19

## 2020-08-19 PROBLEM — N20.1 URETERAL CALCULUS: Status: ACTIVE | Noted: 2020-08-19

## 2020-08-19 LAB
-: ABNORMAL
ABSOLUTE EOS #: 0 K/UL (ref 0–0.44)
ABSOLUTE IMMATURE GRANULOCYTE: 0 K/UL (ref 0–0.3)
ABSOLUTE LYMPH #: 2.76 K/UL (ref 1.1–3.7)
ABSOLUTE MONO #: 0.58 K/UL (ref 0.1–1.2)
ALBUMIN SERPL-MCNC: 3.4 G/DL (ref 3.5–5.2)
ALBUMIN/GLOBULIN RATIO: 1 (ref 1–2.5)
ALP BLD-CCNC: 79 U/L (ref 35–104)
ALT SERPL-CCNC: 42 U/L (ref 5–33)
AMORPHOUS: ABNORMAL
ANION GAP SERPL CALCULATED.3IONS-SCNC: 17 MMOL/L
AST SERPL-CCNC: 24 U/L
BACTERIA: ABNORMAL
BASOPHILS # BLD: 0 % (ref 0–2)
BASOPHILS ABSOLUTE: 0 K/UL (ref 0–0.2)
BILIRUB SERPL-MCNC: 0.9 MG/DL (ref 0.3–1.2)
BILIRUBIN URINE: NEGATIVE
BUN BLDV-MCNC: 20 MG/DL (ref 8–23)
BUN/CREAT BLD: 22 (ref 9–20)
CALCIUM SERPL-MCNC: 8.9 MG/DL (ref 8.6–10.4)
CASTS UA: ABNORMAL /LPF
CHLORIDE BLD-SCNC: 103 MMOL/L (ref 98–107)
CO2: 16 MMOL/L (ref 20–31)
COLOR: YELLOW
COMMENT UA: ABNORMAL
CREAT SERPL-MCNC: 0.91 MG/DL (ref 0.5–0.9)
CRYSTALS, UA: ABNORMAL /HPF
DIFFERENTIAL TYPE: ABNORMAL
EOSINOPHILS RELATIVE PERCENT: 0 % (ref 1–4)
EPITHELIAL CELLS UA: ABNORMAL /HPF (ref 0–25)
GFR AFRICAN AMERICAN: >60 ML/MIN
GFR NON-AFRICAN AMERICAN: >60 ML/MIN
GFR SERPL CREATININE-BSD FRML MDRD: ABNORMAL ML/MIN/{1.73_M2}
GFR SERPL CREATININE-BSD FRML MDRD: ABNORMAL ML/MIN/{1.73_M2}
GLUCOSE BLD-MCNC: 201 MG/DL (ref 70–99)
GLUCOSE URINE: NEGATIVE
HCT VFR BLD CALC: 47.4 % (ref 36.3–47.1)
HEMOGLOBIN: 14 G/DL (ref 11.9–15.1)
IMMATURE GRANULOCYTES: 0 %
KETONES, URINE: ABNORMAL
LEUKOCYTE ESTERASE, URINE: ABNORMAL
LYMPHOCYTES # BLD: 24 % (ref 24–43)
MCH RBC QN AUTO: 32.1 PG (ref 25.2–33.5)
MCHC RBC AUTO-ENTMCNC: 29.5 G/DL (ref 28.4–34.8)
MCV RBC AUTO: 108.7 FL (ref 82.6–102.9)
MONOCYTES # BLD: 5 % (ref 3–12)
MORPHOLOGY: ABNORMAL
MUCUS: ABNORMAL
NITRITE, URINE: NEGATIVE
NRBC AUTOMATED: 0 PER 100 WBC
OTHER OBSERVATIONS UA: ABNORMAL
PDW BLD-RTO: 13.7 % (ref 11.8–14.4)
PH UA: 6 (ref 5–9)
PLATELET # BLD: ABNORMAL K/UL (ref 138–453)
PLATELET ESTIMATE: ABNORMAL
PLATELET, FLUORESCENCE: NORMAL K/UL (ref 138–453)
PMV BLD AUTO: ABNORMAL FL (ref 8.1–13.5)
POTASSIUM SERPL-SCNC: 3.7 MMOL/L (ref 3.7–5.3)
PROTEIN UA: ABNORMAL
RBC # BLD: 4.36 M/UL (ref 3.95–5.11)
RBC # BLD: ABNORMAL 10*6/UL
RBC UA: ABNORMAL /HPF (ref 0–2)
RENAL EPITHELIAL, UA: ABNORMAL /HPF
SEG NEUTROPHILS: 71 % (ref 36–65)
SEGMENTED NEUTROPHILS ABSOLUTE COUNT: 8.16 K/UL (ref 1.5–8.1)
SODIUM BLD-SCNC: 136 MMOL/L (ref 135–144)
SPECIFIC GRAVITY UA: >1.03 (ref 1.01–1.02)
TOTAL PROTEIN: 6.7 G/DL (ref 6.4–8.3)
TRICHOMONAS: ABNORMAL
TURBIDITY: ABNORMAL
URINE HGB: ABNORMAL
UROBILINOGEN, URINE: NORMAL
WBC # BLD: 11.5 K/UL (ref 3.5–11.3)
WBC # BLD: ABNORMAL 10*3/UL
WBC UA: ABNORMAL /HPF (ref 0–5)
YEAST: ABNORMAL

## 2020-08-19 PROCEDURE — 36415 COLL VENOUS BLD VENIPUNCTURE: CPT

## 2020-08-19 PROCEDURE — 1123F ACP DISCUSS/DSCN MKR DOCD: CPT | Performed by: UROLOGY

## 2020-08-19 PROCEDURE — 1090F PRES/ABSN URINE INCON ASSESS: CPT | Performed by: UROLOGY

## 2020-08-19 PROCEDURE — G8427 DOCREV CUR MEDS BY ELIG CLIN: HCPCS | Performed by: UROLOGY

## 2020-08-19 PROCEDURE — 86403 PARTICLE AGGLUT ANTBDY SCRN: CPT

## 2020-08-19 PROCEDURE — 6370000000 HC RX 637 (ALT 250 FOR IP): Performed by: EMERGENCY MEDICINE

## 2020-08-19 PROCEDURE — 99284 EMERGENCY DEPT VISIT MOD MDM: CPT

## 2020-08-19 PROCEDURE — 74176 CT ABD & PELVIS W/O CONTRAST: CPT

## 2020-08-19 PROCEDURE — G8417 CALC BMI ABV UP PARAM F/U: HCPCS | Performed by: UROLOGY

## 2020-08-19 PROCEDURE — G8399 PT W/DXA RESULTS DOCUMENT: HCPCS | Performed by: UROLOGY

## 2020-08-19 PROCEDURE — 99204 OFFICE O/P NEW MOD 45 MIN: CPT | Performed by: UROLOGY

## 2020-08-19 PROCEDURE — 3017F COLORECTAL CA SCREEN DOC REV: CPT | Performed by: UROLOGY

## 2020-08-19 PROCEDURE — 99211 OFF/OP EST MAY X REQ PHY/QHP: CPT | Performed by: UROLOGY

## 2020-08-19 PROCEDURE — 81001 URINALYSIS AUTO W/SCOPE: CPT

## 2020-08-19 PROCEDURE — 96372 THER/PROPH/DIAG INJ SC/IM: CPT

## 2020-08-19 PROCEDURE — 85025 COMPLETE CBC W/AUTO DIFF WBC: CPT

## 2020-08-19 PROCEDURE — 4040F PNEUMOC VAC/ADMIN/RCVD: CPT | Performed by: UROLOGY

## 2020-08-19 PROCEDURE — 6360000002 HC RX W HCPCS: Performed by: EMERGENCY MEDICINE

## 2020-08-19 PROCEDURE — 1036F TOBACCO NON-USER: CPT | Performed by: UROLOGY

## 2020-08-19 PROCEDURE — 85055 RETICULATED PLATELET ASSAY: CPT

## 2020-08-19 PROCEDURE — 87086 URINE CULTURE/COLONY COUNT: CPT

## 2020-08-19 PROCEDURE — 80053 COMPREHEN METABOLIC PANEL: CPT

## 2020-08-19 RX ORDER — 0.9 % SODIUM CHLORIDE 0.9 %
1000 INTRAVENOUS SOLUTION INTRAVENOUS ONCE
Status: DISCONTINUED | OUTPATIENT
Start: 2020-08-19 | End: 2020-08-19

## 2020-08-19 RX ORDER — ONDANSETRON 4 MG/1
4 TABLET, ORALLY DISINTEGRATING ORAL EVERY 8 HOURS PRN
Qty: 20 TABLET | Refills: 0 | Status: SHIPPED | OUTPATIENT
Start: 2020-08-19 | End: 2021-09-27

## 2020-08-19 RX ORDER — MORPHINE SULFATE 4 MG/ML
4 INJECTION, SOLUTION INTRAMUSCULAR; INTRAVENOUS ONCE
Status: COMPLETED | OUTPATIENT
Start: 2020-08-19 | End: 2020-08-19

## 2020-08-19 RX ORDER — HYDROCODONE BITARTRATE AND ACETAMINOPHEN 5; 325 MG/1; MG/1
1 TABLET ORAL EVERY 6 HOURS PRN
Qty: 15 TABLET | Refills: 0 | Status: SHIPPED | OUTPATIENT
Start: 2020-08-19 | End: 2020-08-24

## 2020-08-19 RX ORDER — FENTANYL CITRATE 50 UG/ML
25 INJECTION, SOLUTION INTRAMUSCULAR; INTRAVENOUS ONCE
Status: DISCONTINUED | OUTPATIENT
Start: 2020-08-19 | End: 2020-08-19

## 2020-08-19 RX ORDER — ONDANSETRON 2 MG/ML
4 INJECTION INTRAMUSCULAR; INTRAVENOUS ONCE
Status: DISCONTINUED | OUTPATIENT
Start: 2020-08-19 | End: 2020-08-19

## 2020-08-19 RX ORDER — TAMSULOSIN HYDROCHLORIDE 0.4 MG/1
0.4 CAPSULE ORAL DAILY
Qty: 5 CAPSULE | Refills: 0 | Status: SHIPPED | OUTPATIENT
Start: 2020-08-19 | End: 2021-09-27

## 2020-08-19 RX ORDER — IBUPROFEN 800 MG/1
800 TABLET ORAL EVERY 8 HOURS PRN
Qty: 30 TABLET | Refills: 0 | Status: ON HOLD | OUTPATIENT
Start: 2020-08-19 | End: 2020-08-22 | Stop reason: HOSPADM

## 2020-08-19 RX ORDER — PREDNISONE 10 MG/1
10 TABLET ORAL NIGHTLY
COMMUNITY

## 2020-08-19 RX ORDER — VITAMIN B COMPLEX
100 TABLET ORAL DAILY
COMMUNITY

## 2020-08-19 RX ORDER — ONDANSETRON 4 MG/1
4 TABLET, FILM COATED ORAL ONCE
Status: COMPLETED | OUTPATIENT
Start: 2020-08-19 | End: 2020-08-19

## 2020-08-19 RX ORDER — KETOROLAC TROMETHAMINE 15 MG/ML
15 INJECTION, SOLUTION INTRAMUSCULAR; INTRAVENOUS ONCE
Status: COMPLETED | OUTPATIENT
Start: 2020-08-19 | End: 2020-08-19

## 2020-08-19 RX ADMIN — MORPHINE SULFATE 4 MG: 4 INJECTION, SOLUTION INTRAMUSCULAR; INTRAVENOUS at 02:18

## 2020-08-19 RX ADMIN — ONDANSETRON HYDROCHLORIDE 4 MG: 4 TABLET, FILM COATED ORAL at 02:18

## 2020-08-19 RX ADMIN — KETOROLAC TROMETHAMINE 15 MG: 15 INJECTION, SOLUTION INTRAMUSCULAR; INTRAVENOUS at 02:52

## 2020-08-19 ASSESSMENT — PAIN DESCRIPTION - ORIENTATION: ORIENTATION: LEFT

## 2020-08-19 ASSESSMENT — ENCOUNTER SYMPTOMS
RHINORRHEA: 0
WHEEZING: 0
SHORTNESS OF BREATH: 0
VOMITING: 0
SHORTNESS OF BREATH: 0
ABDOMINAL PAIN: 0
EYE REDNESS: 0
BACK PAIN: 1
VOMITING: 0
ABDOMINAL PAIN: 0
NAUSEA: 0
WHEEZING: 0
EYE PAIN: 0
COLOR CHANGE: 0
NAUSEA: 0
BACK PAIN: 0
COUGH: 0
COLOR CHANGE: 0

## 2020-08-19 ASSESSMENT — PAIN SCALES - GENERAL
PAINLEVEL_OUTOF10: 1
PAINLEVEL_OUTOF10: 1
PAINLEVEL_OUTOF10: 8
PAINLEVEL_OUTOF10: 2

## 2020-08-19 ASSESSMENT — PAIN DESCRIPTION - PAIN TYPE
TYPE: ACUTE PAIN
TYPE: ACUTE PAIN

## 2020-08-19 ASSESSMENT — PAIN DESCRIPTION - PROGRESSION: CLINICAL_PROGRESSION: RAPIDLY IMPROVING

## 2020-08-19 ASSESSMENT — PAIN DESCRIPTION - LOCATION: LOCATION: FLANK

## 2020-08-19 NOTE — PATIENT INSTRUCTIONS
SURVEY:    You may be receiving a survey from [facility] regarding your visit today. Please complete the survey to enable us to provide the highest quality of care to you and your family. If you cannot score us a very good on any question, please call the office to discuss how we could of made your experience a very good one. Thank you.

## 2020-08-19 NOTE — PROGRESS NOTES
HPI:    Patient is a 68 y.o. female in no acute distress. She is alert and oriented to person, place, and time. Patient being seen here today as a new patient. Patient did develop left-sided flank pain that started last night. Patient did not have any gross hematuria dysuria. Patient is never had a kidney stone in the past.  Upon being admitted to the emergency department patient did have an episode of vomiting. Patient did get a CT scan upon admission. This film was independently reviewed today. This does show a 5 mm left ureteral stone. Patient does have other smaller stones in her left kidney as well. Patient has never had stones in the past.  Currently her pain is well controlled with oral pain medication. She has no signs of infection. She reports no flank pain nausea or vomiting currently. She has minimal lower urinary tract symptoms. Past Medical History:   Diagnosis Date    Acid reflux     Arthritis     High cholesterol     High triglycerides     Hypertension      Past Surgical History:   Procedure Laterality Date    CATARACT REMOVAL Right 08/20/2018    with Dr. Adriana George COLONOSCOPY  2009    HYSTERECTOMY      Vaginal    HYSTEROSCOPY      OH XCAPSL CTRC RMVL INSJ IO LENS PROSTH W/O ECP Left 7/30/2018    EYE CATARACT EMULSIFICATION IOL IMPLANT performed by Adilene Hernandez DO at [facility] RMVL INSJ IO LENS PROSTH W/O ECP Right 8/20/2018    EYE CATARACT EMULSIFICATION IOL IMPLANT performed by Adilene Hernandez DO at [address] [place] [address] EXTRACTION       Outpatient Encounter Medications as of 8/19/2020   Medication Sig Dispense Refill    predniSONE (DELTASONE) 10 MG tablet Take 10 mg by mouth daily      metoprolol tartrate (LOPRESSOR) 25 MG tablet Take 25 mg by mouth daily      HYDROcodone-acetaminophen (NORCO) 5-325 MG per tablet Take 1 tablet by mouth every 6 hours as needed for Pain for up to 5 days. Intended supply: 5 days.  Take lowest dose possible to manage pain 15 tablet 0    ibuprofen (ADVIL;MOTRIN) 800 MG tablet Take 1 tablet by mouth every 8 hours as needed for Pain 30 tablet 0    ondansetron (ZOFRAN ODT) 4 MG disintegrating tablet Take 1 tablet by mouth every 8 hours as needed for Nausea 20 tablet 0    tamsulosin (FLOMAX) 0.4 MG capsule Take 1 capsule by mouth daily for 5 doses 5 capsule 0    prednisoLONE acetate (PRED FORTE) 1 % ophthalmic suspension       atorvastatin (LIPITOR) 20 MG tablet Take 20 mg by mouth daily      vitamin E 400 UNIT capsule Take 400 Units by mouth daily.  Cholecalciferol (VITAMIN D3) 2000 UNITS CAPS Take  by mouth.  Omega-3 Fatty Acids (OMEGA 3 PO) Take  by mouth.  Biotin 5000 MCG CAPS Take  by mouth.  omeprazole (PRILOSEC) 10 MG capsule Take 10 mg by mouth daily.  lovastatin (MEVACOR) 40 MG tablet Take 40 mg by mouth nightly.  aspirin 81 MG EC tablet Take 81 mg by mouth daily.  therapeutic multivitamin-minerals (THERAGRAN-M) tablet Take 1 tablet by mouth daily.  Calcium-Vitamin D (CALTRATE 600 PLUS-VIT D PO) Take 600 mg by mouth daily. No facility-administered encounter medications on file as of 8/19/2020. Current Outpatient Medications on File Prior to Visit   Medication Sig Dispense Refill    predniSONE (DELTASONE) 10 MG tablet Take 10 mg by mouth daily      metoprolol tartrate (LOPRESSOR) 25 MG tablet Take 25 mg by mouth daily      HYDROcodone-acetaminophen (NORCO) 5-325 MG per tablet Take 1 tablet by mouth every 6 hours as needed for Pain for up to 5 days. Intended supply: 5 days.  Take lowest dose possible to manage pain 15 tablet 0    ibuprofen (ADVIL;MOTRIN) 800 MG tablet Take 1 tablet by mouth every 8 hours as needed for Pain 30 tablet 0    ondansetron (ZOFRAN ODT) 4 MG disintegrating tablet Take 1 tablet by mouth every 8 hours as needed for Nausea 20 tablet 0    tamsulosin (FLOMAX) 0.4 MG capsule Take 1 capsule by mouth daily for 5 doses 5 capsule 0    prednisoLONE acetate (PRED FORTE) 1 % ophthalmic suspension       atorvastatin (LIPITOR) 20 MG tablet Take 20 mg by mouth daily      vitamin E 400 UNIT capsule Take 400 Units by mouth daily.  Cholecalciferol (VITAMIN D3) 2000 UNITS CAPS Take  by mouth.  Omega-3 Fatty Acids (OMEGA 3 PO) Take  by mouth.  Biotin 5000 MCG CAPS Take  by mouth.  omeprazole (PRILOSEC) 10 MG capsule Take 10 mg by mouth daily.  lovastatin (MEVACOR) 40 MG tablet Take 40 mg by mouth nightly.  aspirin 81 MG EC tablet Take 81 mg by mouth daily.  therapeutic multivitamin-minerals (THERAGRAN-M) tablet Take 1 tablet by mouth daily.  Calcium-Vitamin D (CALTRATE 600 PLUS-VIT D PO) Take 600 mg by mouth daily. No current facility-administered medications on file prior to visit. Other  Family History   Adopted: Yes   Problem Relation Age of Onset    Diabetes Sister      Social History     Tobacco Use   Smoking Status Former Smoker    Types: Cigarettes   Smokeless Tobacco Never Used       Social History     Substance and Sexual Activity   Alcohol Use Yes    Comment: occ       Review of Systems   Constitutional: Negative for appetite change, chills and fever. Eyes: Negative for pain, redness and visual disturbance. Respiratory: Negative for cough, shortness of breath and wheezing. Cardiovascular: Negative for chest pain and leg swelling. Gastrointestinal: Negative for abdominal pain, nausea and vomiting. Genitourinary: Negative for difficulty urinating, dysuria, flank pain, frequency, hematuria, pelvic pain, vaginal bleeding and vaginal discharge. Musculoskeletal: Negative for back pain, joint swelling and myalgias. Skin: Negative for color change, rash and wound. Neurological: Negative for dizziness, tremors and numbness. Hematological: Negative for adenopathy. Does not bruise/bleed easily.        Temp 97.1 °F (36.2 °C) (Temporal)   Ht 5' 4\" (1.626 m)   Wt 208 lb (94.3 kg)   BMI 35.70 kg/m²       PHYSICAL EXAM:  Constitutional: Patient resting comfortably, in no acute distress. Neuro: Alert and oriented to person place and time. Cranial nerves grossly intact. Psych: Mood and affect normal.  Skin: Warm, dry  HEENT: normocephalic, atraumatic  Lymphatics: No palpable lymphadenopathy  Lungs: Respiratory effort normal, unlabored  Cardiovascular:  Normal peripheral pulses  Abdomen: Soft, non-tender, non-distended with no organomegaly or palpable masses. : No CVA tenderness. Bladder non-tender and not distended. Pelvic: deferred    Lab Results   Component Value Date    BUN 20 08/19/2020     Lab Results   Component Value Date    CREATININE 0.91 (H) 08/19/2020       ASSESSMENT:  This is a 68 y.o. female with the following diagnoses:   Diagnosis Orders   1. Renal calculus     2. Ureteral calculus           PLAN:  We did give her a strainer today. We will see her in 2 weeks with a repeat KUB. She will call back sooner if she has intractable nausea vomiting or pain.

## 2020-08-19 NOTE — TELEPHONE ENCOUNTER
----- Message from TERRENCE Middleton CNP sent at 8/19/2020  8:20 AM EDT -----  Needs apt today due to ureteral stone  ----- Message -----  From: Charlie Cottrell MD  Sent: 8/19/2020   5:50 AM EDT  To: Flakita Tavarez MD

## 2020-08-19 NOTE — ED NOTES
and Robb Sherman attempted labs x3 without success. Radha from lab at bedside to attempt bloodwork.         Mary Moon RN  08/19/20 [id number]

## 2020-08-19 NOTE — TELEPHONE ENCOUNTER
Patient seen in ER with stone.   Called patient and left message for her to call office and schedule appointment

## 2020-08-19 NOTE — ED PROVIDER NOTES
[facility] ED  Emergency Department Encounter  EmergencyMedicine Attending     Pt Name:Tracy Gallo  MRN: [id number]  [place] 1950  Date of evaluation: 8/19/20  PCP:  Samantha Martinez MD    [address] [place] [address]       Chief Complaint   Patient presents with    Flank Pain     left, onset 0030       HISTORY OF PRESENT ILLNESS  (Location/Symptom, Timing/Onset, Context/Setting, Quality, Duration, Modifying Factors, Severity.)      Jonelle Hinojosa is a 68 y.o. female who presents with left-sided flank pain that started at 12:30 AM, less than 2 hours prior to arrival.  Significant sharp pain, sudden onset, left-sided flank pain. No dysuria frequency urgency, no blood in her urine. No history of kidney stone, no no history of abdominal aneurysm, however does have a previous history of smoking, 40 years. No cough congestion runny nose, no chest pain shortness of breath difficulty breathing. Had an episode of emesis prior to arrival as well with some nausea sensation. Pain is extremely severe, 10 out of 10, no radiations, left flank, sharp. PAST MEDICAL / SURGICAL / SOCIAL / FAMILY HISTORY      has a past medical history of Acid reflux, Arthritis, High cholesterol, High triglycerides, and Hypertension. has a past surgical history that includes Tubal ligation; Hysterectomy; hysteroscopy; [place] tooth extraction; Colonoscopy (2009); pr xcapsl ctrc rmvl insj io lens prosth w/o ecp (Left, 7/30/2018); Cataract removal (Right, 08/20/2018); and pr xcapsl ctrc rmvl insj io lens prosth w/o ecp (Right, 8/20/2018).     Social History     Socioeconomic History    Marital status:      Spouse name: Not on file    Number of children: Not on file    Years of education: Not on file    Highest education level: Not on file   Occupational History    Not on file   Social Needs    Financial resource strain: Not on file    Food insecurity     Worry: Not on file     Inability: Not on file   [employer] needs (FLOMAX) 0.4 MG capsule Take 1 capsule by mouth daily for 5 doses 8/19/20 8/24/20 Yes Jany Prabhakar MD   atorvastatin (LIPITOR) 20 MG tablet Take 20 mg by mouth daily   Yes Historical Provider, MD   vitamin E 400 UNIT capsule Take 400 Units by mouth daily. Yes Historical Provider, MD   Cholecalciferol (VITAMIN D3) 2000 UNITS CAPS Take  by mouth. Yes Historical Provider, MD   Omega-3 Fatty Acids (OMEGA 3 PO) Take  by mouth. Yes Historical Provider, MD   omeprazole (PRILOSEC) 10 MG capsule Take 10 mg by mouth daily. Yes Historical Provider, MD   therapeutic multivitamin-minerals (THERAGRAN-M) tablet Take 1 tablet by mouth daily. Yes Historical Provider, MD   Calcium-Vitamin D (CALTRATE 600 PLUS-VIT D PO) Take 600 mg by mouth daily. Yes Historical Provider, MD   prednisoLONE acetate (PRED FORTE) 1 % ophthalmic suspension  8/16/18   Historical Provider, MD   Biotin 5000 MCG CAPS Take  by mouth. Historical Provider, MD   lovastatin (MEVACOR) 40 MG tablet Take 40 mg by mouth nightly. Historical Provider, MD   aspirin 81 MG EC tablet Take 81 mg by mouth daily. Historical Provider, MD       REVIEW OF SYSTEMS    (2-9 systems for level 4, 10 or more for level 5)      Review of Systems   Constitutional: Negative for chills and fever. HENT: Negative for congestion and rhinorrhea. Respiratory: Negative for shortness of breath and wheezing. Cardiovascular: Negative for chest pain and leg swelling. Gastrointestinal: Negative for abdominal pain, nausea and vomiting. Genitourinary: Positive for flank pain. Negative for dysuria, frequency, hematuria and urgency (). Musculoskeletal: Positive for back pain. Skin: Negative for color change. Neurological: Negative for weakness and headaches. Psychiatric/Behavioral: Negative for agitation.        PHYSICAL EXAM   (up to 7 for level 4, 8 or more for level 5)      INITIAL VITALS:   Pulse 81   Temp 97.3 °F (36.3 °C)   Resp 24   SpO2 99% Physical Exam  Vitals signs reviewed. Constitutional:       General: She is not in acute distress. Appearance: She is well-developed. HENT:      Head: Normocephalic and atraumatic. Eyes:      General:         Right eye: No discharge. Left eye: No discharge. Conjunctiva/sclera: Conjunctivae normal.   Cardiovascular:      Rate and Rhythm: Normal rate and regular rhythm. Heart sounds: Normal heart sounds. No murmur. No friction rub. No gallop. Pulmonary:      Effort: Pulmonary effort is normal. No respiratory distress. Breath sounds: Normal breath sounds. No wheezing or rales. Abdominal:      General: There is no distension. Palpations: Abdomen is soft. Tenderness: There is no abdominal tenderness. There is no guarding or rebound. Musculoskeletal:         General: Tenderness present. No swelling. Comments: Left-sided CVA tenderness on examination,   Skin:     General: Skin is warm. Findings: No erythema. Neurological:      Mental Status: She is alert. DIFFERENTIAL  DIAGNOSIS     PLAN (LABS / IMAGING / EKG):  Orders Placed This Encounter   Procedures    Urine Culture    CT ABDOMEN PELVIS WO CONTRAST    CBC Auto Differential    Comprehensive Metabolic Panel w/ Reflex to MG    Urinalysis, reflex to microscopic    Immature Platelet Fraction    Microscopic Urinalysis       MEDICATIONS ORDERED:  Orders Placed This Encounter   Medications    DISCONTD: 0.9 % sodium chloride bolus    DISCONTD: ondansetron (ZOFRAN) injection 4 mg    DISCONTD: fentaNYL (SUBLIMAZE) injection 25 mcg    morphine injection 4 mg    ondansetron (ZOFRAN) tablet 4 mg    ketorolac (TORADOL) injection 15 mg    hydrocodone-acetaminophen (NORCO) tablet 5-325 mg (STARTER PACK)    HYDROcodone-acetaminophen (NORCO) 5-325 MG per tablet     Sig: Take 1 tablet by mouth every 6 hours as needed for Pain for up to 5 days. Intended supply: 5 days.  Take lowest dose possible to manage pain     Dispense:  15 tablet     Refill:  0    ibuprofen (ADVIL;MOTRIN) 800 MG tablet     Sig: Take 1 tablet by mouth every 8 hours as needed for Pain     Dispense:  30 tablet     Refill:  0    ondansetron (ZOFRAN ODT) 4 MG disintegrating tablet     Sig: Take 1 tablet by mouth every 8 hours as needed for Nausea     Dispense:  20 tablet     Refill:  0    tamsulosin (FLOMAX) 0.4 MG capsule     Sig: Take 1 capsule by mouth daily for 5 doses     Dispense:  5 capsule     Refill:  0       DDX: Renal stone versus pyelonephritis versus musculoskeletal pain versus abdominal aortic aneurysm versus UTI versus diverticulitis    DIAGNOSTIC RESULTS / EMERGENCY DEPARTMENT COURSE / MDM   :  Results for orders placed or performed during the hospital encounter of 08/19/20   CBC Auto Differential   Result Value Ref Range    WBC 11.5 (H) 3.5 - 11.3 k/uL    RBC 4.36 3.95 - 5.11 m/uL    Hemoglobin 14.0 11.9 - 15.1 g/dL    Hematocrit 47.4 (H) 36.3 - 47.1 %    .7 (H) 82.6 - 102.9 fL    MCH 32.1 25.2 - 33.5 pg    MCHC 29.5 28.4 - 34.8 g/dL    RDW 13.7 11.8 - 14.4 %    Platelets See Reflexed IPF Result 138 - 453 k/uL    MPV NOT REPORTED 8.1 - 13.5 fL    NRBC Automated 0.0 0.0 per 100 WBC    Differential Type NOT REPORTED     WBC Morphology NOT REPORTED     RBC Morphology NOT REPORTED     Platelet Estimate NOT REPORTED     Seg Neutrophils 71 (H) 36 - 65 %    Lymphocytes 24 24 - 43 %    Monocytes 5 3 - 12 %    Eosinophils % 0 (L) 1 - 4 %    Immature Granulocytes 0 0 %    Basophils 0 0 - 2 %    Segs Absolute 8.16 (H) 1.50 - 8.10 k/uL    Absolute Lymph # 2.76 1.10 - 3.70 k/uL    Absolute Mono # 0.58 0.10 - 1.20 k/uL    Absolute Eos # 0.00 0.00 - 0.44 k/uL    Absolute Immature Granulocyte 0.00 0.00 - 0.30 k/uL    Basophils Absolute 0.00 0.0 - 0.2 k/uL    Morphology Platelet scan shows Normal Platelets    Comprehensive Metabolic Panel w/ Reflex to MG   Result Value Ref Range    Glucose 201 (H) 70 - 99 mg/dL    BUN 20 8 - 23 mg/dL CREATININE 0.91 (H) 0.50 - 0.90 mg/dL    Bun/Cre Ratio 22 (H) 9 - 20    Calcium 8.9 8.6 - 10.4 mg/dL    Sodium 136 135 - 144 mmol/L    Potassium 3.7 3.7 - 5.3 mmol/L    Chloride 103 98 - 107 mmol/L    CO2 16 (L) 20 - 31 mmol/L    Anion Gap 17 mmol/L    Alkaline Phosphatase 79 35 - 104 U/L    ALT 42 (H) 5 - 33 U/L    AST 24 <32 U/L    Total Bilirubin 0.90 0.3 - 1.2 mg/dL    Total Protein 6.7 6.4 - 8.3 g/dL    Alb 3.4 (L) 3.5 - 5.2 g/dL    Albumin/Globulin Ratio 1.0 1.0 - 2.5    GFR Non-African American >60 >60 mL/min    GFR African American >60 >60 mL/min    GFR Comment          GFR Staging         Urinalysis, reflex to microscopic   Result Value Ref Range    Color, UA YELLOW YELLOW    Turbidity UA SLIGHTLY CLOUDY (A) CLEAR    Glucose, Ur NEGATIVE NEGATIVE    Bilirubin Urine NEGATIVE NEGATIVE    Ketones, Urine 1+ (A) NEGATIVE    Specific Gravity, UA >1.030 (H) 1.010 - 1.020    Urine Hgb 3+ (A) NEGATIVE    pH, UA 6.0 5.0 - 9.0    Protein, UA 1+ (A) NEGATIVE    Urobilinogen, Urine Normal Normal    Nitrite, Urine NEGATIVE NEGATIVE    Leukocyte Esterase, Urine SMALL (A) NEGATIVE    Urinalysis Comments NOT REPORTED    Immature Platelet Fraction   Result Value Ref Range    Platelet, Fluorescence NOT REPORTED 138 - 453 k/uL   Microscopic Urinalysis   Result Value Ref Range    -          WBC, UA 2 TO 5 0 - 5 /HPF    RBC, UA 50  0 - 2 /HPF    Casts UA NOT REPORTED /LPF    Crystals, UA NOT REPORTED None /HPF    Epithelial Cells UA 5 TO 10 0 - 25 /HPF    Renal Epithelial, UA NOT REPORTED 0 /HPF    Bacteria, UA TRACE (A) None    Mucus, UA NOT REPORTED None    Trichomonas, UA NOT REPORTED None    Amorphous, UA NOT REPORTED None    Other Observations UA NOT REPORTED NOT REQ.     Yeast, UA NOT REPORTED None       IMPRESSION: 78-year-old female who comes into the emergency department secondary to left-sided flank pain, sudden onset, very uncomfortable appearing, immediately a abdominal ultrasound was performed by myself, no AAA was visualized on the abdominal ultrasound. I also did a quick point-of-care bedside ultrasound of the renals, left-sided hydronephrosis. Concern for kidney stone. Blood work including CBC, CMP as well as CT abdomen pelvis was ordered to confirm diagnosis of kidney stone. Will order urine as well, otherwise will do symptomatic treatment with pain control and nausea control. RADIOLOGY:    Ct Abdomen Pelvis Wo Contrast    Result Date: 8/19/2020  EXAMINATION: CT OF THE ABDOMEN AND PELVIS WITHOUT CONTRAST 8/19/2020 2:32 am TECHNIQUE: CT of the abdomen and pelvis was performed without the administration of intravenous contrast. Multiplanar reformatted images are provided for review. Dose modulation, iterative reconstruction, and/or weight based adjustment of the mA/kV was utilized to reduce the radiation dose to as low as reasonably achievable. COMPARISON: None. HISTORY: ORDERING SYSTEM PROVIDED HISTORY: L flank pain. Concern for kidney stone. TECHNOLOGIST PROVIDED HISTORY: L flank pain. Concern for kidney stone. FINDINGS: Lower Chest: Small hiatal hernia. Small right lower lobe calcified pulmonary granulomas. Visualized lung bases demonstrate no focal consolidation or mass. No cardiomegaly, pericardial or pleural effusion. Liver: Normal. Gallbladder and Bile Ducts: Normal. Spleen: Normal. Adrenal Glands: Normal. Pancreas: Normal. Genitourinary: Mild left hydronephrosis secondary to a 4-5 mm stone in the proximal left ureter. No right-sided hydronephrosis. Punctate nonobstructing stone in the superior right kidney. Multiple nonobstructing stones in the left kidney, the largest in the inferior portion measuring up to 5 mm. Urinary bladder is decompressed and not well evaluated. Prior hysterectomy. Bowel: No acute abnormality. Normal caliber bowel. Minimal colonic diverticulosis without acute diverticulitis. Vasculature: Atherosclerosis. No abdominal aortic aneurysm.  Bones and Soft Tissues: Degenerative osseous changes in the visualized spine and pelvis. Retroperitoneum/Mesentery: No intraperitoneal free air, ascites or fluid collection. No lymphadenopathy in the abdomen or pelvis. Mild left hydronephrosis secondary to a 4-5 mm stone in the proximal left ureter. Multiple bilateral nonobstructing kidney stones, left greater than right. EKG  None    All EKG's are interpreted by the Emergency Department Physician who either signs or Co-signs this chart in the absence of a cardiologist.    EMERGENCY DEPARTMENT COURSE:    Normal renal function, no nitrites in urine, some minimal white blood cells but significant epithelial cells, likely a max urine sample with no obvious urine infection at this time. Patient is also afebrile, no tachycardia as well. No significant concern for a urinary infection at this time. Patient pain is resolved on reevaluation. Will continue outpatient treatment with follow-up with urology. PROCEDURES:  None    CONSULTS:  None    CRITICAL CARE:  None    FINAL IMPRESSION      1. Kidney stone          DISPOSITION / PLAN     DISPOSITION  Discharge      PATIENT REFERRED TO:  Laney Guerra MD  [address] [place] [address]  [phone]    Call in 1 day        DISCHARGE MEDICATIONS:  New Prescriptions    HYDROCODONE-ACETAMINOPHEN (NORCO) 5-325 MG PER TABLET    Take 1 tablet by mouth every 6 hours as needed for Pain for up to 5 days. Intended supply: 5 days.  Take lowest dose possible to manage pain    IBUPROFEN (ADVIL;MOTRIN) 800 MG TABLET    Take 1 tablet by mouth every 8 hours as needed for Pain    ONDANSETRON (ZOFRAN ODT) 4 MG DISINTEGRATING TABLET    Take 1 tablet by mouth every 8 hours as needed for Nausea    TAMSULOSIN (FLOMAX) 0.4 MG CAPSULE    Take 1 capsule by mouth daily for 5 doses       Jany Prabhakar MD  Emergency Medicine Attending    (Please note that portions of thisnote were completed with a voice recognition program.  Efforts were made to edit the dictations but occasionally words are mis-transcribed.)       Spencer Barcenas MD  08/19/20 [id number]

## 2020-08-20 ENCOUNTER — HOSPITAL ENCOUNTER (INPATIENT)
Age: 70
LOS: 1 days | Discharge: HOME | DRG: 660 | End: 2020-08-22
Attending: EMERGENCY MEDICINE | Admitting: INTERNAL MEDICINE
Payer: MEDICARE

## 2020-08-20 LAB
-: ABNORMAL
ABSOLUTE EOS #: 0.17 K/UL (ref 0–0.44)
ABSOLUTE IMMATURE GRANULOCYTE: 0 K/UL (ref 0–0.3)
ABSOLUTE LYMPH #: 3.8 K/UL (ref 1.1–3.7)
ABSOLUTE MONO #: 2.15 K/UL (ref 0.1–1.2)
AMORPHOUS: ABNORMAL
ANION GAP SERPL CALCULATED.3IONS-SCNC: 13 MMOL/L (ref 9–17)
BACTERIA: ABNORMAL
BASOPHILS # BLD: 0 % (ref 0–2)
BASOPHILS ABSOLUTE: 0 K/UL (ref 0–0.2)
BILIRUBIN URINE: NEGATIVE
BUN BLDV-MCNC: 23 MG/DL (ref 8–23)
BUN/CREAT BLD: 23 (ref 9–20)
CALCIUM SERPL-MCNC: 9.8 MG/DL (ref 8.6–10.4)
CASTS UA: ABNORMAL /LPF
CHLORIDE BLD-SCNC: 94 MMOL/L (ref 98–107)
CO2: 24 MMOL/L (ref 20–31)
COLOR: YELLOW
COMMENT UA: ABNORMAL
CREAT SERPL-MCNC: 0.98 MG/DL (ref 0.5–0.9)
CRYSTALS, UA: ABNORMAL /HPF
CRYSTALS, UA: ABNORMAL /HPF
CULTURE: NORMAL
DIFFERENTIAL TYPE: ABNORMAL
EOSINOPHILS RELATIVE PERCENT: 1 % (ref 1–4)
EPITHELIAL CELLS UA: ABNORMAL /HPF (ref 0–25)
GFR AFRICAN AMERICAN: >60 ML/MIN
GFR NON-AFRICAN AMERICAN: 56 ML/MIN
GFR SERPL CREATININE-BSD FRML MDRD: ABNORMAL ML/MIN/{1.73_M2}
GFR SERPL CREATININE-BSD FRML MDRD: ABNORMAL ML/MIN/{1.73_M2}
GLUCOSE BLD-MCNC: 143 MG/DL (ref 70–99)
GLUCOSE URINE: NEGATIVE
HCT VFR BLD CALC: 35.7 % (ref 36.3–47.1)
HEMOGLOBIN: 12.2 G/DL (ref 11.9–15.1)
IMMATURE GRANULOCYTES: 0 %
KETONES, URINE: NEGATIVE
LACTIC ACID, WHOLE BLOOD: NORMAL MMOL/L (ref 0.7–2.1)
LACTIC ACID: 1 MMOL/L (ref 0.5–2.2)
LEUKOCYTE ESTERASE, URINE: ABNORMAL
LYMPHOCYTES # BLD: 23 % (ref 24–43)
Lab: NORMAL
MCH RBC QN AUTO: 32 PG (ref 25.2–33.5)
MCHC RBC AUTO-ENTMCNC: 34.2 G/DL (ref 28.4–34.8)
MCV RBC AUTO: 93.7 FL (ref 82.6–102.9)
MONOCYTES # BLD: 13 % (ref 3–12)
MORPHOLOGY: NORMAL
MUCUS: ABNORMAL
NITRITE, URINE: NEGATIVE
NRBC AUTOMATED: 0 PER 100 WBC
OTHER OBSERVATIONS UA: ABNORMAL
PDW BLD-RTO: 13.2 % (ref 11.8–14.4)
PH UA: 5.5 (ref 5–9)
PLATELET # BLD: 212 K/UL (ref 138–453)
PLATELET ESTIMATE: ABNORMAL
PMV BLD AUTO: 10.8 FL (ref 8.1–13.5)
POTASSIUM SERPL-SCNC: 3.8 MMOL/L (ref 3.7–5.3)
PROTEIN UA: ABNORMAL
RBC # BLD: 3.81 M/UL (ref 3.95–5.11)
RBC # BLD: ABNORMAL 10*6/UL
RBC UA: ABNORMAL /HPF (ref 0–2)
RENAL EPITHELIAL, UA: ABNORMAL /HPF
SEG NEUTROPHILS: 63 % (ref 36–65)
SEGMENTED NEUTROPHILS ABSOLUTE COUNT: 10.38 K/UL (ref 1.5–8.1)
SODIUM BLD-SCNC: 131 MMOL/L (ref 135–144)
SPECIFIC GRAVITY UA: >1.03 (ref 1.01–1.02)
SPECIMEN DESCRIPTION: NORMAL
TRICHOMONAS: ABNORMAL
TURBIDITY: CLEAR
URINE HGB: ABNORMAL
UROBILINOGEN, URINE: NORMAL
WBC # BLD: 16.5 K/UL (ref 3.5–11.3)
WBC # BLD: ABNORMAL 10*3/UL
WBC UA: ABNORMAL /HPF (ref 0–5)
YEAST: ABNORMAL

## 2020-08-20 PROCEDURE — 2580000003 HC RX 258: Performed by: EMERGENCY MEDICINE

## 2020-08-20 PROCEDURE — 81001 URINALYSIS AUTO W/SCOPE: CPT

## 2020-08-20 PROCEDURE — 6370000000 HC RX 637 (ALT 250 FOR IP): Performed by: EMERGENCY MEDICINE

## 2020-08-20 PROCEDURE — 85025 COMPLETE CBC W/AUTO DIFF WBC: CPT

## 2020-08-20 PROCEDURE — 36415 COLL VENOUS BLD VENIPUNCTURE: CPT

## 2020-08-20 PROCEDURE — 6360000002 HC RX W HCPCS: Performed by: EMERGENCY MEDICINE

## 2020-08-20 PROCEDURE — 96374 THER/PROPH/DIAG INJ IV PUSH: CPT

## 2020-08-20 PROCEDURE — 99285 EMERGENCY DEPT VISIT HI MDM: CPT

## 2020-08-20 PROCEDURE — 87040 BLOOD CULTURE FOR BACTERIA: CPT

## 2020-08-20 PROCEDURE — 96372 THER/PROPH/DIAG INJ SC/IM: CPT

## 2020-08-20 PROCEDURE — 0T778DZ DILATION OF LEFT URETER WITH INTRALUMINAL DEVICE, VIA NATURAL OR ARTIFICIAL OPENING ENDOSCOPIC: ICD-10-PCS | Performed by: UROLOGY

## 2020-08-20 PROCEDURE — 80048 BASIC METABOLIC PNL TOTAL CA: CPT

## 2020-08-20 PROCEDURE — 83605 ASSAY OF LACTIC ACID: CPT

## 2020-08-20 RX ORDER — 0.9 % SODIUM CHLORIDE 0.9 %
1000 INTRAVENOUS SOLUTION INTRAVENOUS ONCE
Status: COMPLETED | OUTPATIENT
Start: 2020-08-20 | End: 2020-08-21

## 2020-08-20 RX ORDER — MORPHINE SULFATE 4 MG/ML
4 INJECTION, SOLUTION INTRAMUSCULAR; INTRAVENOUS ONCE
Status: COMPLETED | OUTPATIENT
Start: 2020-08-20 | End: 2020-08-20

## 2020-08-20 RX ORDER — KETOROLAC TROMETHAMINE 15 MG/ML
15 INJECTION, SOLUTION INTRAMUSCULAR; INTRAVENOUS ONCE
Status: COMPLETED | OUTPATIENT
Start: 2020-08-20 | End: 2020-08-20

## 2020-08-20 RX ORDER — HYDROCODONE BITARTRATE AND ACETAMINOPHEN 5; 325 MG/1; MG/1
2 TABLET ORAL ONCE
Status: COMPLETED | OUTPATIENT
Start: 2020-08-20 | End: 2020-08-20

## 2020-08-20 RX ADMIN — HYDROCODONE BITARTRATE AND ACETAMINOPHEN 2 TABLET: 5; 325 TABLET ORAL at 23:03

## 2020-08-20 RX ADMIN — WATER 1 G: 1 INJECTION INTRAMUSCULAR; INTRAVENOUS; SUBCUTANEOUS at 23:27

## 2020-08-20 RX ADMIN — MORPHINE SULFATE 4 MG: 4 INJECTION, SOLUTION INTRAMUSCULAR; INTRAVENOUS at 20:11

## 2020-08-20 RX ADMIN — SODIUM CHLORIDE 1000 ML: 9 INJECTION, SOLUTION INTRAVENOUS at 23:25

## 2020-08-20 RX ADMIN — KETOROLAC TROMETHAMINE 15 MG: 15 INJECTION, SOLUTION INTRAMUSCULAR; INTRAVENOUS at 20:11

## 2020-08-20 ASSESSMENT — PAIN SCALES - GENERAL
PAINLEVEL_OUTOF10: 8
PAINLEVEL_OUTOF10: 0
PAINLEVEL_OUTOF10: 7

## 2020-08-20 ASSESSMENT — ENCOUNTER SYMPTOMS
VOMITING: 0
SHORTNESS OF BREATH: 0
ABDOMINAL PAIN: 0
WHEEZING: 0
COLOR CHANGE: 0
NAUSEA: 0

## 2020-08-20 ASSESSMENT — PAIN DESCRIPTION - FREQUENCY: FREQUENCY: CONTINUOUS

## 2020-08-20 ASSESSMENT — PAIN DESCRIPTION - DESCRIPTORS: DESCRIPTORS: ACHING;SHARP

## 2020-08-20 ASSESSMENT — PAIN DESCRIPTION - LOCATION: LOCATION: FLANK

## 2020-08-20 ASSESSMENT — PAIN DESCRIPTION - PAIN TYPE: TYPE: ACUTE PAIN

## 2020-08-20 ASSESSMENT — PAIN DESCRIPTION - ORIENTATION: ORIENTATION: LEFT

## 2020-08-20 NOTE — ED PROVIDER NOTES
Formerly [facility] AT THE [facility] MED SURG  Emergency Department Encounter  EmergencyMedicine Attending     Pt Name:Tracy Disla  MRN: [id number]  [place] 1950  Date of evaluation: 8/20/20  PCP:  Mariluz Null MD    CHIEF COMPLAINT       Chief Complaint   Patient presents with    Flank Pain     left sided, diagnosed with a kidney stone 8/19, saw Dr. Kavitha Waldron today, was told if the pain got to bad to return to the ER       HISTORY OF PRESENT ILLNESS  (Location/Symptom, Timing/Onset, Context/Setting, Quality, Duration, Modifying Factors, Severity.)      Patricio Villalpando is a 68 y.o. female who presents with left-sided flank pain. Patient was recently diagnosed by myself with a kidney stone. Says that she had excellent pain control earlier, was seen by urologist as well. However today her pain was worse. Last time she took a Norco was 6 hours ago around 2 PM.  However says that the Darl Dark was for every 6 hours 1 pill therefore it was not time yet for another pill. Comes back to the emergency department for pain control. Pain is 8 out of 10, left flank, no radiations, similar to the kidney stone that she was recently diagnosed with. No dysuria frequency urgency, no blood in her urine, no chest pain shortness breath difficulty breathing. This is the same pain that she was diagnosed with when she was diagnosed with a a kidney stone a couple days ago by myself. Pain is sharp. PAST MEDICAL / SURGICAL / SOCIAL / FAMILY HISTORY      has a past medical history of Acid reflux, Arthritis, High cholesterol, High triglycerides, Hypertension, and Kidney stone. has a past surgical history that includes Tubal ligation; Hysterectomy; hysteroscopy; [place] tooth extraction; Colonoscopy (2009); pr xcapsl ctrc rmvl insj io lens prosth w/o ecp (Left, 7/30/2018); Cataract removal (Right, 08/20/2018); and pr xcapsl ctrc rmvl insj io lens prosth w/o ecp (Right, 8/20/2018).     Social History     Socioeconomic History    Marital status:  Conrado Manning MD   ibuprofen (ADVIL;MOTRIN) 800 MG tablet Take 1 tablet by mouth every 8 hours as needed for Pain 8/19/20  Yes Theron Amanda MD   ondansetron (ZOFRAN ODT) 4 MG disintegrating tablet Take 1 tablet by mouth every 8 hours as needed for Nausea 8/19/20  Yes Theron Amanda MD   tamsulosin (FLOMAX) 0.4 MG capsule Take 1 capsule by mouth daily for 5 doses 8/19/20 8/24/20 Yes Theron Amanda MD   NONFORMULARY Med Name: Amber Magana   Yes Historical Provider, MD   NONFORMULARY Med Name: Kaylene Amezquita   Yes Historical Provider, MD   Coenzyme Q10 (COQ10) 100 MG CAPS Take 100 mg by mouth daily   Yes Historical Provider, MD   atorvastatin (LIPITOR) 20 MG tablet Take 20 mg by mouth daily   Yes Historical Provider, MD   vitamin E 400 UNIT capsule Take 400 Units by mouth daily. Yes Historical Provider, MD   Cholecalciferol (VITAMIN D3) 2000 UNITS CAPS Take  by mouth. Yes Historical Provider, MD   Omega-3 Fatty Acids (OMEGA 3 PO) Take  by mouth. Yes Historical Provider, MD   Biotin 5000 MCG CAPS Take  by mouth. Yes Historical Provider, MD   omeprazole (PRILOSEC) 10 MG capsule Take 10 mg by mouth daily. Yes Historical Provider, MD   therapeutic multivitamin-minerals (THERAGRAN-M) tablet Take 1 tablet by mouth daily. Yes Historical Provider, MD   Calcium-Vitamin D (CALTRATE 600 PLUS-VIT D PO) Take 600 mg by mouth daily. Yes Historical Provider, MD   prednisoLONE acetate (PRED FORTE) 1 % ophthalmic suspension  8/16/18   Historical Provider, MD   lovastatin (MEVACOR) 40 MG tablet Take 40 mg by mouth nightly. Historical Provider, MD   aspirin 81 MG EC tablet Take 81 mg by mouth daily. Historical Provider, MD       REVIEW OF SYSTEMS    (2-9 systems for level 4, 10 or more for level 5)      Review of Systems   Constitutional: Negative for chills and fever. Respiratory: Negative for shortness of breath and wheezing. Cardiovascular: Negative for chest pain and leg swelling. Gastrointestinal: Negative for abdominal pain, nausea and vomiting. Genitourinary: Positive for flank pain. Negative for dysuria, hematuria and urgency. Skin: Negative for color change. Neurological: Negative for weakness and headaches. Psychiatric/Behavioral: Negative for agitation. PHYSICAL EXAM   (up to 7 for level 4, 8 or more for level 5)      INITIAL VITALS:   BP (!) 141/68   Pulse 72   Temp 98.4 °F (36.9 °C) (Temporal)   Resp 16   Ht 5' 4\" (1.626 m)   Wt 211 lb 10.3 oz (96 kg)   SpO2 93%   BMI 36.33 kg/m²     Physical Exam  Constitutional:       General: She is not in acute distress. Appearance: She is well-developed. HENT:      Head: Normocephalic and atraumatic. Cardiovascular:      Rate and Rhythm: Normal rate and regular rhythm. Heart sounds: Normal heart sounds. No murmur. No friction rub. No gallop. Pulmonary:      Effort: Pulmonary effort is normal. No respiratory distress. Breath sounds: Normal breath sounds. No wheezing or rales. Abdominal:      General: There is no distension. Palpations: Abdomen is soft. Tenderness: There is no abdominal tenderness. There is no guarding or rebound. Musculoskeletal:         General: Tenderness present. Comments: Left-sided CVA tenderness on examination. Skin:     General: Skin is warm. Findings: No erythema. Neurological:      Mental Status: She is alert.          DIFFERENTIAL  DIAGNOSIS     PLAN (LABS / IMAGING / EKG):  Orders Placed This Encounter   Procedures    Culture, Blood 1    Culture, Blood 1    Urinalysis Reflex to Culture    Microscopic Urinalysis    CBC auto differential    Basic Metabolic Panel    Lactic acid, plasma    CBC    Comprehensive Metabolic Panel w/ Reflex to MG    Diet NPO, After Midnight    Vital signs per unit routine    Notify physician    Up as tolerated    Place intermittent pneumatic compression device    Full Code    Inpatient consult to Case NEGATIVE    Leukocyte Esterase, Urine MODERATE (A) NEGATIVE    Urinalysis Comments NOT REPORTED    Microscopic Urinalysis   Result Value Ref Range    -          WBC, UA 5 TO 10 0 - 5 /HPF    RBC, UA 5 TO 10 0 - 2 /HPF    Casts UA NOT REPORTED /LPF    Crystals, UA CALCIUM OXALATE (A) None /HPF    Crystals, UA 5 TO 10 (A) None /HPF    Epithelial Cells UA 2 TO 5 0 - 25 /HPF    Renal Epithelial, UA NOT REPORTED 0 /HPF    Bacteria, UA 2+ (A) None    Mucus, UA TRACE (A) None    Trichomonas, UA NOT REPORTED None    Amorphous, UA NOT REPORTED None    Other Observations UA NOT REPORTED NOT REQ.     Yeast, UA NOT REPORTED None   CBC auto differential   Result Value Ref Range    WBC 16.5 (H) 3.5 - 11.3 k/uL    RBC 3.81 (L) 3.95 - 5.11 m/uL    Hemoglobin 12.2 11.9 - 15.1 g/dL    Hematocrit 35.7 (L) 36.3 - 47.1 %    MCV 93.7 82.6 - 102.9 fL    MCH 32.0 25.2 - 33.5 pg    MCHC 34.2 28.4 - 34.8 g/dL    RDW 13.2 11.8 - 14.4 %    Platelets 816 214 - 025 k/uL    MPV 10.8 8.1 - 13.5 fL    NRBC Automated 0.0 0.0 per 100 WBC    Differential Type NOT REPORTED     WBC Morphology NOT REPORTED     RBC Morphology NOT REPORTED     Platelet Estimate NOT REPORTED     Seg Neutrophils 63 36 - 65 %    Lymphocytes 23 (L) 24 - 43 %    Monocytes 13 (H) 3 - 12 %    Eosinophils % 1 1 - 4 %    Immature Granulocytes 0 0 %    Basophils 0 0 - 2 %    Segs Absolute 10.38 (H) 1.50 - 8.10 k/uL    Absolute Lymph # 3.80 (H) 1.10 - 3.70 k/uL    Absolute Mono # 2.15 (H) 0.10 - 1.20 k/uL    Absolute Eos # 0.17 0.00 - 0.44 k/uL    Absolute Immature Granulocyte 0.00 0.00 - 0.30 k/uL    Basophils Absolute 0.00 0.0 - 0.2 k/uL    Morphology Normal    Basic Metabolic Panel   Result Value Ref Range    Glucose 143 (H) 70 - 99 mg/dL    BUN 23 8 - 23 mg/dL    CREATININE 0.98 (H) 0.50 - 0.90 mg/dL    Bun/Cre Ratio 23 (H) 9 - 20    Calcium 9.8 8.6 - 10.4 mg/dL    Sodium 131 (L) 135 - 144 mmol/L    Potassium 3.8 3.7 - 5.3 mmol/L    Chloride 94 (L) 98 - 107 mmol/L    CO2 24 20 - 31 mmol/L    Anion Gap 13 9 - 17 mmol/L    GFR Non-African American 56 (L) >60 mL/min    GFR African American >60 >60 mL/min    GFR Comment          GFR Staging         Lactic acid, plasma   Result Value Ref Range    Lactic Acid 1.0 0.5 - 2.2 mmol/L    Lactic Acid, Whole Blood NOT REPORTED 0.7 - 2.1 mmol/L       IMPRESSION: 58-year-old female who comes into the emergency department for pain control for her kidney stone. Patient took 1 Norco 6 hours ago, and has been taking ibuprofen 800. Was doing really well until today when she started having severe pain. No tachycardia no fevers, no chills, no Dysuria frequency urgency. We will go ahead and make sure that there is no concurrent infection in the urinary tract and get a urine on the patient. Otherwise we will achieve pain control. Patient is nontoxic appearing, comfortable, will do Toradol and morphine and reassess to see how her pain control is. I explained to the patient that if she is having severe pain then she can take 2 Norco's every 6 hours instead of just 1 every 6 hours. RADIOLOGY:    Ct Abdomen Pelvis Wo Contrast    Result Date: 8/19/2020  EXAMINATION: CT OF THE ABDOMEN AND PELVIS WITHOUT CONTRAST 8/19/2020 2:32 am TECHNIQUE: CT of the abdomen and pelvis was performed without the administration of intravenous contrast. Multiplanar reformatted images are provided for review. Dose modulation, iterative reconstruction, and/or weight based adjustment of the mA/kV was utilized to reduce the radiation dose to as low as reasonably achievable. COMPARISON: None. HISTORY: ORDERING SYSTEM PROVIDED HISTORY: L flank pain. Concern for kidney stone. TECHNOLOGIST PROVIDED HISTORY: L flank pain. Concern for kidney stone. FINDINGS: Lower Chest: Small hiatal hernia. Small right lower lobe calcified pulmonary granulomas. Visualized lung bases demonstrate no focal consolidation or mass. No cardiomegaly, pericardial or pleural effusion.  Liver: Normal. to the hospital.  Discussed with Dr. Nagi Apodaca and plan to admit to his service. PROCEDURES:  None    CONSULTS:  IP CONSULT TO CASE MANAGEMENT    CRITICAL CARE:  None    FINAL IMPRESSION      1. Kidney stone    2. Acute cystitis without hematuria    3.  Leukocytosis, unspecified type          DISPOSITION / PLAN     DISPOSITION Admitted 08/21/2020 12:10:33 AM      PATIENT REFERRED TO:  Gordon Hicks MD  [address] [place] [address]  [phone]            DISCHARGE MEDICATIONS:  Current Discharge Medication List          Monse Rojas MD  Emergency Medicine Attending    (Please note that portions of thisnote were completed with a voice recognition program.  Efforts were made to edit the dictations but occasionally words are mis-transcribed.)       Monse Rojas MD  08/21/20 1981

## 2020-08-21 ENCOUNTER — ANESTHESIA (OUTPATIENT)
Dept: OPERATING ROOM | Age: 70
DRG: 660 | End: 2020-08-21
Payer: MEDICARE

## 2020-08-21 ENCOUNTER — ANESTHESIA EVENT (OUTPATIENT)
Dept: OPERATING ROOM | Age: 70
DRG: 660 | End: 2020-08-21
Payer: MEDICARE

## 2020-08-21 ENCOUNTER — APPOINTMENT (OUTPATIENT)
Dept: GENERAL RADIOLOGY | Age: 70
DRG: 660 | End: 2020-08-21
Payer: MEDICARE

## 2020-08-21 VITALS — SYSTOLIC BLOOD PRESSURE: 170 MMHG | OXYGEN SATURATION: 94 % | DIASTOLIC BLOOD PRESSURE: 62 MMHG

## 2020-08-21 PROBLEM — E87.1 HYPONATREMIA: Status: ACTIVE | Noted: 2020-08-21

## 2020-08-21 PROBLEM — N39.0 UTI (URINARY TRACT INFECTION): Status: ACTIVE | Noted: 2020-08-21

## 2020-08-21 LAB
ALBUMIN SERPL-MCNC: 3.3 G/DL (ref 3.5–5.2)
ALBUMIN/GLOBULIN RATIO: 1.2 (ref 1–2.5)
ALP BLD-CCNC: 62 U/L (ref 35–104)
ALT SERPL-CCNC: 29 U/L (ref 5–33)
ANION GAP SERPL CALCULATED.3IONS-SCNC: 8 MMOL/L (ref 9–17)
AST SERPL-CCNC: 23 U/L
BILIRUB SERPL-MCNC: 1.37 MG/DL (ref 0.3–1.2)
BUN BLDV-MCNC: 18 MG/DL (ref 8–23)
BUN/CREAT BLD: 25 (ref 9–20)
CALCIUM SERPL-MCNC: 8.7 MG/DL (ref 8.6–10.4)
CHLORIDE BLD-SCNC: 96 MMOL/L (ref 98–107)
CO2: 24 MMOL/L (ref 20–31)
CREAT SERPL-MCNC: 0.72 MG/DL (ref 0.5–0.9)
ESTIMATED AVERAGE GLUCOSE: 154 MG/DL
GFR AFRICAN AMERICAN: >60 ML/MIN
GFR NON-AFRICAN AMERICAN: >60 ML/MIN
GFR SERPL CREATININE-BSD FRML MDRD: ABNORMAL ML/MIN/{1.73_M2}
GFR SERPL CREATININE-BSD FRML MDRD: ABNORMAL ML/MIN/{1.73_M2}
GLUCOSE BLD-MCNC: 180 MG/DL (ref 70–99)
HBA1C MFR BLD: 7 % (ref 4–6)
HCT VFR BLD CALC: 34.7 % (ref 36.3–47.1)
HEMOGLOBIN: 11.1 G/DL (ref 11.9–15.1)
MCH RBC QN AUTO: 30.9 PG (ref 25.2–33.5)
MCHC RBC AUTO-ENTMCNC: 32 G/DL (ref 28.4–34.8)
MCV RBC AUTO: 96.7 FL (ref 82.6–102.9)
NRBC AUTOMATED: 0 PER 100 WBC
PDW BLD-RTO: 13.2 % (ref 11.8–14.4)
PLATELET # BLD: 205 K/UL (ref 138–453)
PMV BLD AUTO: 9.9 FL (ref 8.1–13.5)
POTASSIUM SERPL-SCNC: 4.2 MMOL/L (ref 3.7–5.3)
RBC # BLD: 3.59 M/UL (ref 3.95–5.11)
SODIUM BLD-SCNC: 128 MMOL/L (ref 135–144)
TOTAL PROTEIN: 6 G/DL (ref 6.4–8.3)
WBC # BLD: 9.9 K/UL (ref 3.5–11.3)

## 2020-08-21 PROCEDURE — 80053 COMPREHEN METABOLIC PANEL: CPT

## 2020-08-21 PROCEDURE — 6370000000 HC RX 637 (ALT 250 FOR IP): Performed by: UROLOGY

## 2020-08-21 PROCEDURE — 36415 COLL VENOUS BLD VENIPUNCTURE: CPT

## 2020-08-21 PROCEDURE — 3700000000 HC ANESTHESIA ATTENDED CARE: Performed by: UROLOGY

## 2020-08-21 PROCEDURE — 74018 RADEX ABDOMEN 1 VIEW: CPT

## 2020-08-21 PROCEDURE — 2580000003 HC RX 258: Performed by: UROLOGY

## 2020-08-21 PROCEDURE — 3600000012 HC SURGERY LEVEL 2 ADDTL 15MIN: Performed by: UROLOGY

## 2020-08-21 PROCEDURE — 3600000002 HC SURGERY LEVEL 2 BASE: Performed by: UROLOGY

## 2020-08-21 PROCEDURE — 6360000002 HC RX W HCPCS: Performed by: UROLOGY

## 2020-08-21 PROCEDURE — 7100000010 HC PHASE II RECOVERY - FIRST 15 MIN: Performed by: UROLOGY

## 2020-08-21 PROCEDURE — 2500000003 HC RX 250 WO HCPCS: Performed by: NURSE ANESTHETIST, CERTIFIED REGISTERED

## 2020-08-21 PROCEDURE — 2709999900 HC NON-CHARGEABLE SUPPLY: Performed by: UROLOGY

## 2020-08-21 PROCEDURE — 1200000000 HC SEMI PRIVATE

## 2020-08-21 PROCEDURE — 6360000002 HC RX W HCPCS: Performed by: NURSE ANESTHETIST, CERTIFIED REGISTERED

## 2020-08-21 PROCEDURE — 83036 HEMOGLOBIN GLYCOSYLATED A1C: CPT

## 2020-08-21 PROCEDURE — 94760 N-INVAS EAR/PLS OXIMETRY 1: CPT

## 2020-08-21 PROCEDURE — 2580000003 HC RX 258: Performed by: INTERNAL MEDICINE

## 2020-08-21 PROCEDURE — 85027 COMPLETE CBC AUTOMATED: CPT

## 2020-08-21 PROCEDURE — C2617 STENT, NON-COR, TEM W/O DEL: HCPCS | Performed by: UROLOGY

## 2020-08-21 PROCEDURE — 6370000000 HC RX 637 (ALT 250 FOR IP): Performed by: NURSE PRACTITIONER

## 2020-08-21 PROCEDURE — 2580000003 HC RX 258: Performed by: NURSE ANESTHETIST, CERTIFIED REGISTERED

## 2020-08-21 PROCEDURE — 3700000001 HC ADD 15 MINUTES (ANESTHESIA): Performed by: UROLOGY

## 2020-08-21 PROCEDURE — C1769 GUIDE WIRE: HCPCS | Performed by: UROLOGY

## 2020-08-21 DEVICE — URETERAL STENT
Type: IMPLANTABLE DEVICE | Status: FUNCTIONAL
Brand: PERCUFLEX™ PLUS

## 2020-08-21 RX ORDER — POLYETHYLENE GLYCOL 3350 17 G/17G
17 POWDER, FOR SOLUTION ORAL DAILY PRN
Status: DISCONTINUED | OUTPATIENT
Start: 2020-08-21 | End: 2020-08-22 | Stop reason: HOSPADM

## 2020-08-21 RX ORDER — OMEPRAZOLE 10 MG/1
10 CAPSULE, DELAYED RELEASE ORAL DAILY
Status: DISCONTINUED | OUTPATIENT
Start: 2020-08-21 | End: 2020-08-22 | Stop reason: HOSPADM

## 2020-08-21 RX ORDER — ATORVASTATIN CALCIUM 20 MG/1
20 TABLET, FILM COATED ORAL NIGHTLY
Status: DISCONTINUED | OUTPATIENT
Start: 2020-08-22 | End: 2020-08-21

## 2020-08-21 RX ORDER — HYDROCODONE BITARTRATE AND ACETAMINOPHEN 5; 325 MG/1; MG/1
1 TABLET ORAL EVERY 6 HOURS PRN
Status: DISCONTINUED | OUTPATIENT
Start: 2020-08-21 | End: 2020-08-22 | Stop reason: HOSPADM

## 2020-08-21 RX ORDER — SODIUM CHLORIDE 0.9 % (FLUSH) 0.9 %
10 SYRINGE (ML) INJECTION PRN
Status: DISCONTINUED | OUTPATIENT
Start: 2020-08-21 | End: 2020-08-22 | Stop reason: HOSPADM

## 2020-08-21 RX ORDER — SODIUM CHLORIDE 9 MG/ML
INJECTION, SOLUTION INTRAVENOUS CONTINUOUS
Status: DISCONTINUED | OUTPATIENT
Start: 2020-08-21 | End: 2020-08-21

## 2020-08-21 RX ORDER — PROPOFOL 10 MG/ML
INJECTION, EMULSION INTRAVENOUS PRN
Status: DISCONTINUED | OUTPATIENT
Start: 2020-08-21 | End: 2020-08-21 | Stop reason: SDUPTHER

## 2020-08-21 RX ORDER — DEXAMETHASONE SODIUM PHOSPHATE 4 MG/ML
INJECTION, SOLUTION INTRA-ARTICULAR; INTRALESIONAL; INTRAMUSCULAR; INTRAVENOUS; SOFT TISSUE PRN
Status: DISCONTINUED | OUTPATIENT
Start: 2020-08-21 | End: 2020-08-21 | Stop reason: SDUPTHER

## 2020-08-21 RX ORDER — MIDAZOLAM HYDROCHLORIDE 1 MG/ML
INJECTION INTRAMUSCULAR; INTRAVENOUS PRN
Status: DISCONTINUED | OUTPATIENT
Start: 2020-08-21 | End: 2020-08-21 | Stop reason: SDUPTHER

## 2020-08-21 RX ORDER — IBUPROFEN 800 MG/1
800 TABLET ORAL EVERY 8 HOURS PRN
Status: DISCONTINUED | OUTPATIENT
Start: 2020-08-21 | End: 2020-08-22 | Stop reason: HOSPADM

## 2020-08-21 RX ORDER — SODIUM CHLORIDE 9 MG/ML
INJECTION, SOLUTION INTRAVENOUS CONTINUOUS
Status: DISCONTINUED | OUTPATIENT
Start: 2020-08-21 | End: 2020-08-22 | Stop reason: HOSPADM

## 2020-08-21 RX ORDER — TAMSULOSIN HYDROCHLORIDE 0.4 MG/1
0.4 CAPSULE ORAL DAILY
Status: DISCONTINUED | OUTPATIENT
Start: 2020-08-21 | End: 2020-08-22 | Stop reason: HOSPADM

## 2020-08-21 RX ORDER — ONDANSETRON 2 MG/ML
INJECTION INTRAMUSCULAR; INTRAVENOUS PRN
Status: DISCONTINUED | OUTPATIENT
Start: 2020-08-21 | End: 2020-08-21 | Stop reason: SDUPTHER

## 2020-08-21 RX ORDER — HYDROCODONE BITARTRATE AND ACETAMINOPHEN 5; 325 MG/1; MG/1
1 TABLET ORAL EVERY 6 HOURS PRN
Status: DISCONTINUED | OUTPATIENT
Start: 2020-08-21 | End: 2020-08-21

## 2020-08-21 RX ORDER — ACETAMINOPHEN 650 MG/1
650 SUPPOSITORY RECTAL EVERY 6 HOURS PRN
Status: DISCONTINUED | OUTPATIENT
Start: 2020-08-21 | End: 2020-08-22 | Stop reason: HOSPADM

## 2020-08-21 RX ORDER — SODIUM CHLORIDE 0.9 % (FLUSH) 0.9 %
10 SYRINGE (ML) INJECTION PRN
Status: DISCONTINUED | OUTPATIENT
Start: 2020-08-21 | End: 2020-08-21

## 2020-08-21 RX ORDER — MORPHINE SULFATE 2 MG/ML
2 INJECTION, SOLUTION INTRAMUSCULAR; INTRAVENOUS EVERY 4 HOURS PRN
Status: DISCONTINUED | OUTPATIENT
Start: 2020-08-21 | End: 2020-08-22 | Stop reason: HOSPADM

## 2020-08-21 RX ORDER — FENTANYL CITRATE 50 UG/ML
INJECTION, SOLUTION INTRAMUSCULAR; INTRAVENOUS PRN
Status: DISCONTINUED | OUTPATIENT
Start: 2020-08-21 | End: 2020-08-21 | Stop reason: SDUPTHER

## 2020-08-21 RX ORDER — SODIUM CHLORIDE 0.9 % (FLUSH) 0.9 %
10 SYRINGE (ML) INJECTION EVERY 12 HOURS SCHEDULED
Status: DISCONTINUED | OUTPATIENT
Start: 2020-08-21 | End: 2020-08-21

## 2020-08-21 RX ORDER — SODIUM CHLORIDE, SODIUM LACTATE, POTASSIUM CHLORIDE, CALCIUM CHLORIDE 600; 310; 30; 20 MG/100ML; MG/100ML; MG/100ML; MG/100ML
INJECTION, SOLUTION INTRAVENOUS CONTINUOUS PRN
Status: DISCONTINUED | OUTPATIENT
Start: 2020-08-21 | End: 2020-08-21 | Stop reason: SDUPTHER

## 2020-08-21 RX ORDER — IBUPROFEN 800 MG/1
800 TABLET ORAL EVERY 8 HOURS PRN
Status: DISCONTINUED | OUTPATIENT
Start: 2020-08-21 | End: 2020-08-21

## 2020-08-21 RX ORDER — TAMSULOSIN HYDROCHLORIDE 0.4 MG/1
0.4 CAPSULE ORAL DAILY
Status: DISCONTINUED | OUTPATIENT
Start: 2020-08-21 | End: 2020-08-21

## 2020-08-21 RX ORDER — SODIUM CHLORIDE 0.9 % (FLUSH) 0.9 %
10 SYRINGE (ML) INJECTION EVERY 12 HOURS SCHEDULED
Status: DISCONTINUED | OUTPATIENT
Start: 2020-08-21 | End: 2020-08-22 | Stop reason: HOSPADM

## 2020-08-21 RX ORDER — OMEPRAZOLE 10 MG/1
10 CAPSULE, DELAYED RELEASE ORAL DAILY
Status: DISCONTINUED | OUTPATIENT
Start: 2020-08-21 | End: 2020-08-21

## 2020-08-21 RX ORDER — LIDOCAINE HYDROCHLORIDE 20 MG/ML
INJECTION, SOLUTION INFILTRATION; PERINEURAL PRN
Status: DISCONTINUED | OUTPATIENT
Start: 2020-08-21 | End: 2020-08-21 | Stop reason: SDUPTHER

## 2020-08-21 RX ORDER — PROMETHAZINE HYDROCHLORIDE 25 MG/1
12.5 TABLET ORAL EVERY 6 HOURS PRN
Status: DISCONTINUED | OUTPATIENT
Start: 2020-08-21 | End: 2020-08-22 | Stop reason: HOSPADM

## 2020-08-21 RX ORDER — ONDANSETRON 2 MG/ML
4 INJECTION INTRAMUSCULAR; INTRAVENOUS EVERY 6 HOURS PRN
Status: DISCONTINUED | OUTPATIENT
Start: 2020-08-21 | End: 2020-08-22 | Stop reason: HOSPADM

## 2020-08-21 RX ORDER — OMEPRAZOLE 10 MG/1
10 CAPSULE, DELAYED RELEASE ORAL DAILY
Status: DISCONTINUED | OUTPATIENT
Start: 2020-08-22 | End: 2020-08-21

## 2020-08-21 RX ORDER — POLYETHYLENE GLYCOL 3350 17 G/17G
17 POWDER, FOR SOLUTION ORAL DAILY PRN
Status: DISCONTINUED | OUTPATIENT
Start: 2020-08-21 | End: 2020-08-21

## 2020-08-21 RX ORDER — ONDANSETRON 4 MG/1
4 TABLET, ORALLY DISINTEGRATING ORAL EVERY 8 HOURS PRN
Status: DISCONTINUED | OUTPATIENT
Start: 2020-08-21 | End: 2020-08-21

## 2020-08-21 RX ORDER — ACETAMINOPHEN 325 MG/1
650 TABLET ORAL EVERY 6 HOURS PRN
Status: DISCONTINUED | OUTPATIENT
Start: 2020-08-21 | End: 2020-08-22 | Stop reason: HOSPADM

## 2020-08-21 RX ORDER — ATORVASTATIN CALCIUM 20 MG/1
20 TABLET, FILM COATED ORAL NIGHTLY
Status: DISCONTINUED | OUTPATIENT
Start: 2020-08-21 | End: 2020-08-22 | Stop reason: HOSPADM

## 2020-08-21 RX ORDER — TAMSULOSIN HYDROCHLORIDE 0.4 MG/1
0.4 CAPSULE ORAL DAILY
Status: DISCONTINUED | OUTPATIENT
Start: 2020-08-22 | End: 2020-08-21

## 2020-08-21 RX ORDER — ATORVASTATIN CALCIUM 20 MG/1
20 TABLET, FILM COATED ORAL DAILY
Status: DISCONTINUED | OUTPATIENT
Start: 2020-08-21 | End: 2020-08-21

## 2020-08-21 RX ORDER — ONDANSETRON 4 MG/1
4 TABLET, ORALLY DISINTEGRATING ORAL EVERY 8 HOURS PRN
Status: DISCONTINUED | OUTPATIENT
Start: 2020-08-21 | End: 2020-08-22 | Stop reason: HOSPADM

## 2020-08-21 RX ORDER — KETAMINE HYDROCHLORIDE 100 MG/ML
INJECTION, SOLUTION INTRAMUSCULAR; INTRAVENOUS PRN
Status: DISCONTINUED | OUTPATIENT
Start: 2020-08-21 | End: 2020-08-21 | Stop reason: SDUPTHER

## 2020-08-21 RX ORDER — MORPHINE SULFATE 2 MG/ML
2 INJECTION, SOLUTION INTRAMUSCULAR; INTRAVENOUS EVERY 4 HOURS PRN
Status: DISCONTINUED | OUTPATIENT
Start: 2020-08-21 | End: 2020-08-21

## 2020-08-21 RX ADMIN — FENTANYL CITRATE 25 MCG: 50 INJECTION, SOLUTION INTRAMUSCULAR; INTRAVENOUS at 08:33

## 2020-08-21 RX ADMIN — SODIUM CHLORIDE: 9 INJECTION, SOLUTION INTRAVENOUS at 01:55

## 2020-08-21 RX ADMIN — ONDANSETRON 4 MG: 2 INJECTION INTRAMUSCULAR; INTRAVENOUS at 15:35

## 2020-08-21 RX ADMIN — KETAMINE HYDROCHLORIDE 20 MG: 100 INJECTION INTRAMUSCULAR; INTRAVENOUS at 08:35

## 2020-08-21 RX ADMIN — SODIUM CHLORIDE: 9 INJECTION, SOLUTION INTRAVENOUS at 18:41

## 2020-08-21 RX ADMIN — POLYETHYLENE GLYCOL 3350 17 G: 17 POWDER, FOR SOLUTION ORAL at 20:22

## 2020-08-21 RX ADMIN — FENTANYL CITRATE 25 MCG: 50 INJECTION, SOLUTION INTRAMUSCULAR; INTRAVENOUS at 08:27

## 2020-08-21 RX ADMIN — ONDANSETRON 4 MG: 2 INJECTION INTRAMUSCULAR; INTRAVENOUS at 08:37

## 2020-08-21 RX ADMIN — PROPOFOL 50 MG: 10 INJECTION, EMULSION INTRAVENOUS at 08:28

## 2020-08-21 RX ADMIN — SODIUM CHLORIDE: 9 INJECTION, SOLUTION INTRAVENOUS at 11:28

## 2020-08-21 RX ADMIN — DEXTROSE MONOHYDRATE 2 G: 50 INJECTION, SOLUTION INTRAVENOUS at 08:18

## 2020-08-21 RX ADMIN — SODIUM CHLORIDE, POTASSIUM CHLORIDE, SODIUM LACTATE AND CALCIUM CHLORIDE: 600; 310; 30; 20 INJECTION, SOLUTION INTRAVENOUS at 08:19

## 2020-08-21 RX ADMIN — KETAMINE HYDROCHLORIDE 30 MG: 100 INJECTION INTRAMUSCULAR; INTRAVENOUS at 08:29

## 2020-08-21 RX ADMIN — ATORVASTATIN CALCIUM 20 MG: 20 TABLET, FILM COATED ORAL at 20:14

## 2020-08-21 RX ADMIN — ONDANSETRON 4 MG: 2 INJECTION INTRAMUSCULAR; INTRAVENOUS at 09:25

## 2020-08-21 RX ADMIN — TAMSULOSIN HYDROCHLORIDE 0.4 MG: 0.4 CAPSULE ORAL at 15:36

## 2020-08-21 RX ADMIN — METOPROLOL TARTRATE 25 MG: 25 TABLET, FILM COATED ORAL at 15:36

## 2020-08-21 RX ADMIN — MIDAZOLAM 1 MG: 1 INJECTION INTRAMUSCULAR; INTRAVENOUS at 08:27

## 2020-08-21 RX ADMIN — LIDOCAINE HYDROCHLORIDE 3 ML: 20 INJECTION, SOLUTION INFILTRATION; PERINEURAL at 08:28

## 2020-08-21 RX ADMIN — WATER 1 G: 1 INJECTION INTRAMUSCULAR; INTRAVENOUS; SUBCUTANEOUS at 20:14

## 2020-08-21 RX ADMIN — DEXAMETHASONE SODIUM PHOSPHATE 4 MG: 4 INJECTION, SOLUTION INTRAMUSCULAR; INTRAVENOUS at 08:37

## 2020-08-21 ASSESSMENT — PULMONARY FUNCTION TESTS
PIF_VALUE: 1
PIF_VALUE: 2
PIF_VALUE: 1
PIF_VALUE: 0
PIF_VALUE: 1
PIF_VALUE: 2
PIF_VALUE: 0
PIF_VALUE: 1
PIF_VALUE: 0
PIF_VALUE: 0
PIF_VALUE: 1
PIF_VALUE: 1

## 2020-08-21 ASSESSMENT — PAIN SCALES - GENERAL
PAINLEVEL_OUTOF10: 0

## 2020-08-21 ASSESSMENT — LIFESTYLE VARIABLES: SMOKING_STATUS: 0

## 2020-08-21 NOTE — PROGRESS NOTES
Patient back from the bathroom with 2 RN assisting and a wheelchair. Pt able to void but misses hat. Pt is diaphoretic, had an emesis x2, is hyperventilating, tearful and anxious. Pt instructed to slow breathing down and breath in through her nose and out through her mouth. Pt states \"I know what to do. \" but does not listen to writers instructions and continues to hyperventilate. Pt is dizzy sitting at edge of bed and nauseous when laying down. Writer to stay at patients side. Danette Fry CNP called to bedside. Update on patient condition and is now assessing the patient.

## 2020-08-21 NOTE — PROGRESS NOTES
Patient has been resting with eyes closed most of the night. Patient has not called out for any needs. Patient is independent in room. Each time writer popped her head in the room patient was resting with eyes closed. Will continue to monitor.

## 2020-08-21 NOTE — H&P (VIEW-ONLY)
CATARACT EMULSIFICATION IOL IMPLANT performed by Madeline Hoyos DO at [facility] EXTRACTION         Family History:       Adopted: Yes   Problem Relation Age of Onset    Diabetes Sister        Social History:   TOBACCO:   reports that she has quit smoking. Her smoking use included cigarettes. She has never used smokeless tobacco.  ETOH:   reports current alcohol use. ELICIT DRUG USE:    Social History     Substance and Sexual Activity   Drug Use No     OCCUPATION: Retired      Allergies: Other and Adhesive tape    Medications Prior to Admission:    Prior to Admission medications    Medication Sig Start Date End Date Taking? Authorizing Provider   predniSONE (DELTASONE) 10 MG tablet Take 10 mg by mouth nightly    Yes Historical Provider, MD   metoprolol tartrate (LOPRESSOR) 25 MG tablet Take 25 mg by mouth daily   Yes Historical Provider, MD   HYDROcodone-acetaminophen (NORCO) 5-325 MG per tablet Take 1 tablet by mouth every 6 hours as needed for Pain for up to 5 days. Intended supply: 5 days. Take lowest dose possible to manage pain 8/19/20 8/24/20 Yes Baudilio Lancaster MD   ibuprofen (ADVIL;MOTRIN) 800 MG tablet Take 1 tablet by mouth every 8 hours as needed for Pain 8/19/20  Yes Baudilio Lancaster MD   ondansetron (ZOFRAN ODT) 4 MG disintegrating tablet Take 1 tablet by mouth every 8 hours as needed for Nausea 8/19/20  Yes Baudilio Lancaster MD   tamsulosin (FLOMAX) 0.4 MG capsule Take 1 capsule by mouth daily for 5 doses 8/19/20 8/24/20 Yes Baudilio Lancaster MD   NONFORMULARY Med Name: Jane Ringer   Yes Historical Provider, MD   NONFORMULARY Med Name: Ebony Billy   Yes Historical Provider, MD   Coenzyme Q10 (COQ10) 100 MG CAPS Take 100 mg by mouth daily   Yes Historical Provider, MD   atorvastatin (LIPITOR) 20 MG tablet Take 20 mg by mouth daily   Yes Historical Provider, MD   vitamin E 400 UNIT capsule Take 400 Units by mouth daily.    Yes Historical Provider, MD Cholecalciferol (VITAMIN D3) 2000 UNITS CAPS Take  by mouth. Yes Historical Provider, MD   Omega-3 Fatty Acids (OMEGA 3 PO) Take  by mouth. Yes Historical Provider, MD   Biotin 5000 MCG CAPS Take  by mouth. Yes Historical Provider, MD   omeprazole (PRILOSEC) 10 MG capsule Take 10 mg by mouth daily. Yes Historical Provider, MD   therapeutic multivitamin-minerals (THERAGRAN-M) tablet Take 1 tablet by mouth daily. Yes Historical Provider, MD   Calcium-Vitamin D (CALTRATE 600 PLUS-VIT D PO) Take 600 mg by mouth daily. Yes Historical Provider, MD   prednisoLONE acetate (PRED FORTE) 1 % ophthalmic suspension  8/16/18   Historical Provider, MD   lovastatin (MEVACOR) 40 MG tablet Take 40 mg by mouth nightly. Historical Provider, MD   aspirin 81 MG EC tablet Take 81 mg by mouth daily. Historical Provider, MD       Review of Systems:  Constitutional:negative  for fevers, and negative for chills. Eyes: negative for visual disturbance   ENT: negative for sore throat, negative nasal congestion, and negative for earache  Respiratory: negative for shortness of breath, negative for cough, and negative for wheezing  Cardiovascular: negative for chest pain, negative for palpitations, and negative for syncope  Gastrointestinal: positive for abdominal pain, positive for nausea,positive for vomiting, negative for diarrhea, negative for constipation, and negative for hematochezia or melena  Genitourinary: positive for dysuria, positive for urinary urgency, positive for urinary frequency, and negative for hematuria  Skin: negative for skin rash, and negative for skin lesions  Neurological: negative for unilateral weakness, numbness or tingling.     Physical Exam:    Vitals:   Temp: 98.1 °F (36.7 °C)  BP: (!) 155/68  Resp: 18  Pulse: 63  SpO2: 98 %  24HR INTAKE/OUTPUT:      Intake/Output Summary (Last 24 hours) at 8/21/2020 1135  Last data filed at 8/21/2020 8432  Gross per 24 hour   Intake 1485 ml   Output 150 ml

## 2020-08-21 NOTE — PROGRESS NOTES
Discharge Criteria    Inpatients must meet Criteria 1 through 7. All other patients are either YES or N/A. If a NO is chosen then Anesthesia or Surgeon must be notified. 1.  Minimum 30 minutes after last dose of sedative medication, minimum 120 minutes after last dose of reversal agent. NO - see note      2. Systolic BP stable within 20 mmHg for 30 minutes & systolic BP between 90 & 271 or within 10 mmHg of baseline. Yes      3. Pulse between 60 and 100 or within 10 bpm of baseline. Yes      4. Spontaneous respiratory rate >/= 10 per minute. Yes      5. SaO2 >/= 95 or  >/= baseline. Yes      6. Able to cough and swallow or return to baseline function. Yes      7. Alert and oriented or return to baseline mental status. Yes      8. Demonstrates controlled, coordinated movements, ambulates with steady gait, or return to baseline activity function. N/A      9. Minimal or no pain or nausea, or at a level tolerable and acceptable to patient. N/A      10. Takes and retains oral fluids as allowed. N/A      11. Procedural / perioperative site stable. Minimal or no bleeding. N/A          12. If GI endoscopy procedure, minimal or no abdominal distention or passing flatus. N/A      13. Written discharge instructions and emergency telephone number provided. N/A      14. Accompanied by a responsible adult.     N/A

## 2020-08-21 NOTE — ANESTHESIA PRE PROCEDURE
(PRILOSEC) 10 MG capsule Take 10 mg by mouth daily. Yes Historical Provider, MD   therapeutic multivitamin-minerals (THERAGRAN-M) tablet Take 1 tablet by mouth daily. Yes Historical Provider, MD   Calcium-Vitamin D (CALTRATE 600 PLUS-VIT D PO) Take 600 mg by mouth daily. Yes Historical Provider, MD   prednisoLONE acetate (PRED FORTE) 1 % ophthalmic suspension  8/16/18   Historical Provider, MD   lovastatin (MEVACOR) 40 MG tablet Take 40 mg by mouth nightly. Historical Provider, MD   aspirin 81 MG EC tablet Take 81 mg by mouth daily.       Historical Provider, MD       Current medications:    Current Facility-Administered Medications   Medication Dose Route Frequency Provider Last Rate Last Dose    [MAR Hold] atorvastatin (LIPITOR) tablet 20 mg  20 mg Oral Daily Justen Snyder MD        [facility] Hold] HYDROcodone-acetaminophen [facility]) 5-325 MG per tablet 1 tablet  1 tablet Oral Q6H PRN Justen Snyder MD        [facility] Hold] ibuprofen (ADVIL;MOTRIN) tablet 800 mg  800 mg Oral Q8H PRN Justen Snyder MD        [facility] Hold] metoprolol tartrate (LOPRESSOR) tablet 25 mg  25 mg Oral Daily Justen Snyder MD        [facility] Hold] ondansetron (ZOFRAN-ODT) disintegrating tablet 4 mg  4 mg Oral Q8H PRN Justen Snyder MD        [facility] Hold] omeprazole [facility]) delayed release capsule 10 mg  10 mg Oral Daily Justen Snyder MD        [facility] Hold] tamsulosin [facility]) capsule 0.4 mg  0.4 mg Oral Daily Justen Snyder MD        [facility] Hold] 0.9 % sodium chloride infusion   Intravenous Continuous Jutsen Snyder  mL/hr at 08/21/20 0155      [MAR Hold] sodium chloride flush 0.9 % injection 10 mL  10 mL Intravenous 2 times per day Justen Snyder MD        [facility] Hold] sodium chloride flush 0.9 % injection 10 mL  10 mL Intravenous PRN Justen Snyder MD        [facility] Hold] acetaminophen (TYLENOL) tablet 650 mg  650 mg Oral Q6H PRN Justen Snyder MD        Or   [facility] Hold] acetaminophen (TYLENOL) suppository 650 mg  650 mg Rectal Q6H PRN Mario Rojas MD        [facility] Hold] polyethylene glycol [facility]) packet 17 g  17 g Oral Daily PRN Mario Rojas MD        [facility] Hold] promethazine (PHENERGAN) tablet 12.5 mg  12.5 mg Oral Q6H PRN Mario Rojas MD        Or    [facility] Hold] ondansetron [facility]) injection 4 mg  4 mg Intravenous Q6H PRN Mario Rojas MD        [facility] Hold] cefTRIAXone (ROCEPHIN) 1 g in sterile water 10 mL IV syringe  1 g Intravenous Q24H Mario Rojas MD        [facility] Hold] morphine (PF) injection 2 mg  2 mg Intravenous Q4H PRN Mario Rojas MD        ceFAZolin (ANCEF) 2 g in dextrose 5 % 100 mL IVPB  2 g Intravenous On Call to Bautista Vazquez MD           Allergies:     Allergies   Allergen Reactions    Other Itching     Wool    Adhesive Tape Rash       Problem List:    Patient Active Problem List   Diagnosis Code    Renal calculus N20.0    Ureteral calculus N20.1    UTI (urinary tract infection) N39.0       Past Medical History:        Diagnosis Date    Acid reflux     Arthritis     High cholesterol     High triglycerides     Hypertension     Kidney stone        Past Surgical History:        Procedure Laterality Date    CATARACT REMOVAL Right 08/20/2018    with Dr. Umm Almonte  2009    HYSTERECTOMY      Vaginal    HYSTEROSCOPY      SD XCAPSL CTRC RMVL INSJ IO LENS PROSTH W/O ECP Left 7/30/2018    EYE CATARACT EMULSIFICATION IOL IMPLANT performed by Leigh Grant DO at [address] [place] [address] Dr Valdes W/O ECP Right 8/20/2018    EYE CATARACT EMULSIFICATION IOL IMPLANT performed by Leigh Grant DO at [address] [place] [address]         Social History:    Social History     Tobacco Use    Smoking status: Former Smoker     Types: Cigarettes    Smokeless tobacco: Never Used   Substance Use Topics    Alcohol use: Yes     Comment: occ                                Counseling given: Not Answered      Vital Signs (Current):   Vitals:    08/20/20 2325 08/21/20 0055 08/21/20 0515 08/21/20 0645   BP: (!) 145/47 (!) 141/68  (!) 160/71   Pulse: 73 72  84   Resp: 18 16  16   Temp:  36.9 °C (98.4 °F)  36.6 °C (97.9 °F)   TempSrc:  Temporal  Temporal   SpO2:  93%  95%   Weight:  211 lb 10.3 oz (96 kg) 211 lb 10.3 oz (96 kg)    Height:  5' 4\" (1.626 m)                                                BP Readings from Last 3 Encounters:   08/21/20 (!) 160/71   08/19/20 (!) 160/74   08/19/20 (!) 156/45       NPO Status: Time of last liquid consumption: 2330                        Time of last solid consumption: 1700                        Date of last liquid consumption: 08/20/20                        Date of last solid food consumption: 08/20/20    BMI:   Wt Readings from Last 3 Encounters:   08/21/20 211 lb 10.3 oz (96 kg)   08/19/20 208 lb (94.3 kg)   09/14/18 202 lb (91.6 kg)     Body mass index is 36.33 kg/m². CBC:   Lab Results   Component Value Date    WBC 9.9 08/21/2020    RBC 3.59 08/21/2020    HGB 11.1 08/21/2020    HCT 34.7 08/21/2020    MCV 96.7 08/21/2020    RDW 13.2 08/21/2020     08/21/2020       CMP:   Lab Results   Component Value Date     08/21/2020    K 4.2 08/21/2020    CL 96 08/21/2020    CO2 24 08/21/2020    BUN 18 08/21/2020    CREATININE 0.72 08/21/2020    GFRAA >60 08/21/2020    LABGLOM >60 08/21/2020    GLUCOSE 180 08/21/2020    PROT 6.0 08/21/2020    CALCIUM 8.7 08/21/2020    BILITOT 1.37 08/21/2020    ALKPHOS 62 08/21/2020    AST 23 08/21/2020    ALT 29 08/21/2020       POC Tests: No results for input(s): POCGLU, POCNA, POCK, POCCL, POCBUN, POCHEMO, POCHCT in the last 72 hours.     Coags: No results found for: PROTIME, INR, APTT    HCG (If Applicable): No results found for: PREGTESTUR, PREGSERUM, HCG, HCGQUANT     ABGs: No results found for: PHART, PO2ART, CBL0ECJ, WWH8IDY, BEART, H4CDIFYC     Type & Screen (If Applicable):  No results found for: LABABO, [address]    Drug/Infectious Status (If Applicable):  No results found for: HIV, HEPCAB    COVID-19 Screening (If Applicable): No results found for: COVID19      Anesthesia Evaluation  Patient summary reviewed and Nursing notes reviewed no history of anesthetic complications:   Airway: Mallampati: III  TM distance: <3 FB   Neck ROM: full  Mouth opening: > = 3 FB Dental:          Pulmonary:Negative Pulmonary ROS and normal exam        (-) not a current smoker                           Cardiovascular:  Exercise tolerance: good (>4 METS),   (+) hypertension:, peripheral edema (+1), hyperlipidemia                  Neuro/Psych:   Negative Neuro/Psych ROS              GI/Hepatic/Renal:   (+) GERD: well controlled, renal disease: kidney stones, morbid obesity          Endo/Other:    (+) : arthritis: rheumatoid and OA., .                 Abdominal:   (+) obese,         Vascular: negative vascular ROS. Anesthesia Plan      MAC     ASA 3       Induction: intravenous. MIPS: Postoperative opioids intended and Prophylactic antiemetics administered. Anesthetic plan and risks discussed with patient.                       Martha Barrera, APRN - CRNA   8/21/2020

## 2020-08-21 NOTE — PROGRESS NOTES
Discussed discharge plans with the . Patient is a 68year old female here with UTI. She is alert and oriented. Patient is  and lives at home with her . She uses no medical equipment. Both the patient and her  do the cooking and the cleaning. Patient is independent with her ADL's. She manages her own medication and drives. Her PCP is Noah Bernard MD. She has medical insurance that helps with medication costs. The discharge plan is home with no services. She does not have advance directives. JUDIW to monitor and assist with any needs or concerns as they arise.     JEFRY Honeycutt

## 2020-08-21 NOTE — PROGRESS NOTES
calls writer into room due to patient c/o being cold and dizzy. Temperature is WDL, extras blankets provided. Pt vitals and neuro exam obtained as charted. Pt then able to state 'I have an upset stomach. \" PRN Zofran given for nausea. Will continue to monitor.

## 2020-08-21 NOTE — PROGRESS NOTES
Patient arrived to the [facility] floor, to room 320 via wheelchair at this time. Patient ambulated independently to the hospital bed. Patient used restroom at this time. Fluids hooked up to continue bolus.

## 2020-08-21 NOTE — PROGRESS NOTES
Patient back from surgery at this time. Pt ambulates self to the bathroom with writer assisting. Pt gait is unsteady. Pt able to void. Pt c/o feeling woozy and nauseous. Pt had an episode of emesis x1. Will give patient zofran soon for nausea. Pt ambulated self back to bed with writer. Updated to call this write when she has to void again. Pt verbalizes understanding.

## 2020-08-21 NOTE — PROGRESS NOTES
Initial shift assessment done and VS obtained as charted. Pt is A&Ox4, answers questions appropriately. Neuro check done, no changes from previous. Pt assisted to bathroom and back to bed, tolerated well with slight dizziness. Output recorded in flow sheet. Pt denies any other needs at this time. Will continue to monitor.

## 2020-08-21 NOTE — CONSULTS
Nutrition Assessment     Type and Reason for Visit: Initial(Nutrition Screen)    Nutrition Recommendations/Plan:   1. Start General diet. 2. Suggest prune juice / miralax to stimulate bowel movement. 3. F/U with diet ed needs. Nutrition Assessment:  Inadequate oral intake related to altered GI function as evidenced by nausea, vomiting, intake 26-50%, constipation, and back pain. Pt reports having decreased oral intakes for 3 days due to back pain. Pt reports early morning vomiting on Wednesday. Last bowel movement on Tuesday (3 days), and regularity is daily per pt. Usual weight per pt is 200# and last weighed 200# on Monday. Wt gain of 11# since admission is noted. BLE edema of +1 pitting is noted. Pt reports being extremely thirsty. Pt was previously nauseous, but would like water now. Pt takes multiple supplements at home. Pt anticipates eating well once she gets her first meal, although nothing sounds good. Labs reviewed. Blood sugar is acutely elevated. No A1c on file. Pt does not desire a nutritional supplement post-op. Pt is at moderate nutrition risk and does not meet criteria for malnutrition. Malnutrition Assessment:  Malnutrition Status: At risk for malnutrition (Comment)    Estimated Daily Nutrient Needs:  Energy (kcal): 3394-1128(71-7); Weight Used for Energy Requirements:  Current     Protein (g): 65-75(1.2-1.4); Weight Used for Protein Requirements:  Ideal        Fluid (ml/day): 1800 ml+; Weight Used for Fluid Requirements:  Current      Nutrition Related Findings: edema: BLE: +1 pitting      Current Nutrition Therapies:    DIET GENERAL; Anthropometric Measures:  · Height: 5' 4\" (162.6 cm)  · Current Body Wt: 211 lb 10.3 oz (96 kg)   · BMI: 36.3    Nutrition Diagnosis:   · Inadequate oral intake related to altered GI function as evidenced by nausea, vomiting, intake 26-50%, constipation, and back pain.        Nutrition Interventions:   Food and/or Nutrient Delivery:  Start Oral Diet  Nutrition Education/Counseling:  No recommendation at this time   Coordination of Nutrition Care:  Continued Inpatient Monitoring    Goals:  PO intake without intolerance       Nutrition Monitoring and Evaluation:   Behavioral-Environmental Outcomes:      Food/Nutrient Intake Outcomes:  Food and Nutrient Intake  Physical Signs/Symptoms Outcomes:  Biochemical Data, Constipation, Nausea or Vomiting, Fluid Status or Edema, Weight     Discharge Planning:    Continue current diet     Electronically signed by Yariel Pate RD, LD on 8/21/20 at 10:23 AM EDT    [id number]

## 2020-08-21 NOTE — PROGRESS NOTES
Previous nurse contacted and left message after shift change for Dr. Lisa Farooq to update regarding patient fall this morning. Physician returned call at this time. Writer updated regarding patient status, neuro checks and increased vital sign frequency. Writer also states patient has no complaints since other than minimal dizziness occasionally. Will continue to monitor.

## 2020-08-21 NOTE — PROGRESS NOTES
Informed Deloris of patient's Na+ level of 138 on 8/20/20 and 128 on 8/21/02. Asked nurse to watch Na+ level per [id number] [employer]U.

## 2020-08-21 NOTE — PROGRESS NOTES
Patient is steady on her feet with no weakness and requests to be independent in room. Patient denies pain at this time. Patient is aware of prn pain medication if patient were to have pain. Patient states she will be having a procedure done in the morning by urology . Exact procedure and time underdetermined per patient. Patient NPO at this time. Patient is lying comfortably in bed with call light, bedside table and personal belonging within reach. Patient denies any other needs at this time. Will continue to monitor.

## 2020-08-21 NOTE — ED NOTES
Called Dr Olamide Wynn via cell and left message to call ER.  Waiting for call back      Merlin Coop  08/20/20 1797

## 2020-08-21 NOTE — PROGRESS NOTES
Dr. Melonie Martinez just called at this time and stated he will be taking patient down for surgery early this morning.  Was unable to give an exact time at this time.

## 2020-08-21 NOTE — CONSULTS
Urology Consultation    Patient:  Igor Heller  MRN: [id number]  YOB: 1950    CHIEF COMPLAINT: Left flank pain    HISTORY OF PRESENT ILLNESS:   The patient is a 68 y.o. female who presents with left flank pain. Patient was recently seen in our office approximately 2 days ago. She was found to have a 5 mm stone in the left ureter. Last evening her pain did get much worse. She also developed worsening pain. Patient did have an elevated white blood cell count of 16.5. She did get a dose of Rocephin which did bring her white count down to 9.5. She has had her pain controlled with IV pain medication. She was seen in our office approximately 2 days ago. She did want a trial of medical expulsive therapy. Patient's old records, notes and chart reviewed and summarized above.     Past Medical History:    Past Medical History:   Diagnosis Date    Acid reflux     Arthritis     High cholesterol     High triglycerides     Hypertension     Kidney stone        Past Surgical History:    Past Surgical History:   Procedure Laterality Date    CATARACT REMOVAL Right 08/20/2018    with Dr. Ángel Mullins  2009    HYSTERECTOMY      Vaginal    HYSTEROSCOPY      [place] XCAPSL CTRC RMVL INSJ IO LENS PROSTH W/O ECP Left 7/30/2018    EYE CATARACT EMULSIFICATION IOL IMPLANT performed by Lenny Menjivar DO at [facility] RMVL INSJ IO LENS PROSTH W/O ECP Right 8/20/2018    EYE CATARACT EMULSIFICATION IOL IMPLANT performed by Lenny Menjivar DO at [address] [place] [address] 4 WISDOM TOOTH EXTRACTION       Previous  surgery: none   Medications:    Scheduled Meds:   [MAR Hold] atorvastatin  20 mg Oral Daily    [MAR Hold] metoprolol tartrate  25 mg Oral Daily    [MAR Hold] omeprazole  10 mg Oral Daily    [MAR Hold] tamsulosin  0.4 mg Oral Daily    [MAR Hold] sodium chloride flush  10 mL Intravenous 2 times per day    [MAR Hold] cefTRIAXone (ROCEPHIN) IV  1 g Intravenous Q24H    ceFAZolin  2 g Intravenous On Call to OR     Continuous Infusions:   [MAR Hold] sodium chloride 125 mL/hr at 08/21/20 0155     PRN Meds:. [MAR Hold] HYDROcodone-acetaminophen, [MAR Hold] ibuprofen, [MAR Hold] ondansetron, [MAR Hold] sodium chloride flush, [MAR Hold] acetaminophen **OR** [MAR Hold] acetaminophen, [MAR Hold] polyethylene glycol, [MAR Hold] promethazine **OR** [MAR Hold] ondansetron, [MAR Hold] morphine    Allergies:   Other and Adhesive tape    Social History:    Social History     Socioeconomic History    Marital status:      Spouse name: Not on file    Number of children: Not on file    Years of education: Not on file    Highest education level: Not on file   Occupational History    Not on file   Social Needs    Financial resource strain: Not on file    Food insecurity     Worry: Not on file     Inability: Not on file    Transportation needs     Medical: Not on file     Non-medical: Not on file   Tobacco Use    Smoking status: Former Smoker     Types: Cigarettes    Smokeless tobacco: Never Used   Substance and Sexual Activity    Alcohol use: Yes     Comment: occ    Drug use: No    Sexual activity: Never   Lifestyle    Physical activity     Days per week: Not on file     Minutes per session: Not on file    Stress: Not on file   Relationships    Social connections     Talks on phone: Not on file     Gets together: Not on file     Attends Hoahaoism service: Not on file     Active member of club or organization: Not on file     Attends meetings of clubs or organizations: Not on file     Relationship status: Not on file    Intimate partner violence     Fear of current or ex partner: Not on file     Emotionally abused: Not on file     Physically abused: Not on file     Forced sexual activity: Not on file   Other Topics Concern    Not on file   Social History Narrative    Not on file       Family History:    Family History   Adopted: Yes   Problem Relation Age of Onset    Diabetes Sister      Previous Urologic Family history: none  REVIEW OF SYSTEMS:  Constitutional: negative  Eyes: negative  Respiratory: negative  Cardiovascular: negative  Gastrointestinal: negative  Genitourinary: see HPI  Musculoskeletal: negative  Skin: negative   Neurological: negative  Hematological/Lymphatic: negative  Psychological: negative    Physical Exam:      This a 68 y.o. female   Patient Vitals for the past 24 hrs:   BP Temp Temp src Pulse Resp SpO2 Height Weight   08/21/20 0645 (!) 160/71 97.9 °F (36.6 °C) Temporal 84 16 95 % -- --   08/21/20 0515 -- -- -- -- -- -- -- 211 lb 10.3 oz (96 kg)   08/21/20 0055 (!) 141/68 98.4 °F (36.9 °C) Temporal 72 16 93 % 5' 4\" (1.626 m) 211 lb 10.3 oz (96 kg)   08/20/20 2325 (!) 145/47 -- -- 73 18 -- -- --   08/20/20 1928 (!) 154/79 97.9 °F (36.6 °C) Tympanic 75 20 95 % -- --     Constitutional: Patient in no acute distress. Neuro: Alert and oriented to person, place and time. Psych: mood and affect normal  HEENT negative  Lungs: Respiratory effort is normal  Cardiovascular: Normal peripheral pulses  Abdomen: Soft, non-tender, non-distended with no CVA, flank pain or hepatosplenomegaly. No hernias. Kidneys normal.  Lymphatics: No palpable lymphadenopathy. Bladder non-tender and not distended.   Pelvic exam: deferred  Rectal exam not indicated    LABS:   Recent Labs     08/19/20 0410 08/20/20 2155 08/21/20  0551   WBC 11.5* 16.5* 9.9   HGB 14.0 12.2 11.1*   HCT 47.4* 35.7* 34.7*   .7* 93.7 96.7   PLT See Reflexed IPF Result 212 205     Recent Labs     08/19/20 0410 08/20/20 2155 08/21/20  0551    131* 128*   K 3.7 3.8 4.2    94* 96*   CO2 16* 24 24   BUN 20 23 18   CREATININE 0.91* 0.98* 0.72       Additional Lab/culture results:    Urinalysis:   Recent Labs     08/20/20 2000   COLORU YELLOW   PHUR 5.5   WBCUA 5 TO 10   RBCUA 5 TO 10   MUCUS TRACE*   TRICHOMONAS NOT REPORTED   YEAST NOT REPORTED   BACTERIA 2+*   SPECGRAV >1.030*   LEUKOCYTESUR MODERATE*   UROBILINOGEN Normal   BILIRUBINUR NEGATIVE        -----------------------------------------------------------------  Imaging Results:      Assessment and Plan   Impression:    Patient Active Problem List   Diagnosis    Renal calculus    Ureteral calculus    UTI (urinary tract infection)       Plan: We will plan for cystoscopy and left ureteral stent today.     Markus Thomas  7:21 AM 8/21/2020

## 2020-08-21 NOTE — PLAN OF CARE
Nutrition Problem #1: Inadequate oral intake  Intervention: Food and/or Nutrient Delivery: Start Oral Diet  Nutritional Goals: PO intake without intolerance    Electronically signed by Rosalind ZAFAR, JOSE, LD on 8/21/2020 at 10:48 AM    Contact Number:  [phone]

## 2020-08-21 NOTE — PROGRESS NOTES
Writer no longer left the room and patients bathroom call light was on. Pt's  opened the door and asked the patient whats wrong. Pt's  yells out \"my wife fell\". Writer enters room and bathroom to find patient sitting on her butt in the bathroom next to the toilet. Pt states \"I'm fine I just lost my balance and fell to the floor. I fell easily and didn't hit my head or anything. \" Vitals obtained and neuro exam completed. All are WDL. Writer asked patient if anything hurts, pt replies with \"Nothing hurts but my pride. \" all limbs move freely and without pain. Pt assisted up with 2 RN's and placed back on the toilet. Writer offered to stay in the bathroom with patient but she refuses stating she has to have a BM and would like some privacy. Bedside table placed in front of patient to help steady her on the toilet. Pt again instructed to call when she is finished for assistance back to bed. Pt verbalizes understanding. Will notify all providers of incident.

## 2020-08-21 NOTE — PROGRESS NOTES
Patient assessed and vitals obtained this morning. Pt is alert and oriented x4. Pt noted to be anxious about upcoming surgery. Pt educated on what the procedure is and when she is expected to go downstairs. Pt verbalizes understanding. Pt lung sounds are clear with unlabored breaths. Bowel sounds are active throughout all quadrants. Pt denies any pain at this time, but does state she intermittently has left flank pain. Will continue to monitor.

## 2020-08-21 NOTE — PROGRESS NOTES
Patient calls out for assistance to the bathroom and back. Writer assisted patient with 1 assisted, contact guard to the bathroom. Pt gait is steady but patient is weak. Pt into bathroom with no issues. Writer shuts door while patient is actively sitting down on the toilet for privacy. Pt is instructed to call when finished. Pt verbalizes understanding.

## 2020-08-21 NOTE — H&P
History and Physical    Patient:  Vonnie Butcher  MRN: [id number]    Chief Complaint:  Flank pain    History Obtained From:  patient, electronic medical record    PCP: Brain Wagner MD    History of Present Illness: The patient is a 68 y.o. female who presented to the ER yesterday with complaints of left sided flank pain. She recently was seen in the ER and diagnosed with a kidney stone and discharged after they reviewed with Dr. India Hobson. She was followed up in the office 2 days ago. Yesterday she developed worsening pain and came back to the ER for evaluation. During her workup she was found to have an elevated WBC of 16.5. Her Lactic Acid was 1.0. She was found to have hyponatremia with a NA of 131. She was also found to have an UTI as well and was given Rocephin in the ER. She was admitted with UTI with both renal and ureteral stones. Dr. India Hobson was consulted and she was taken for cystoscopy with stent placement this morning. Upon return to her room and up to the  she did stand and fall in the bathroom without injury. She states she got dizzy. She denied hitting her head. Currently she is sitting on the bedside tachypnic, anxious and nauseated. Complaining of urgency. I explained to her that she has a stent in and that is a normal feeling. I did get her to the chair and to relax back and coached her to slow her breathing down.       Past Medical History:        Diagnosis Date    Acid reflux     Arthritis     High cholesterol     High triglycerides     Hypertension     Kidney stone        Past Surgical History:        Procedure Laterality Date    CATARACT REMOVAL Right 08/20/2018    with Dr. Cristino Blanton  2009    HYSTERECTOMY      Vaginal    HYSTEROSCOPY      MO XCAPSL CTRC RMVL INSJ IO LENS PROSTH W/O ECP Left 7/30/2018    EYE CATARACT EMULSIFICATION IOL IMPLANT performed by Ronnie Alberto DO at [address] [facility] W/O ECP Right 8/20/2018    EYE CATARACT EMULSIFICATION IOL IMPLANT performed by Lenny Menjivar DO at [address] [facility] EXTRACTION         Family History:       Adopted: Yes   Problem Relation Age of Onset    Diabetes Sister        Social History:   TOBACCO:   reports that she has quit smoking. Her smoking use included cigarettes. She has never used smokeless tobacco.  ETOH:   reports current alcohol use. ELICIT DRUG USE:    Social History     Substance and Sexual Activity   Drug Use No     OCCUPATION: Retired      Allergies: Other and Adhesive tape    Medications Prior to Admission:    Prior to Admission medications    Medication Sig Start Date End Date Taking? Authorizing Provider   predniSONE (DELTASONE) 10 MG tablet Take 10 mg by mouth nightly    Yes Historical Provider, MD   metoprolol tartrate (LOPRESSOR) 25 MG tablet Take 25 mg by mouth daily   Yes Historical Provider, MD   HYDROcodone-acetaminophen (NORCO) 5-325 MG per tablet Take 1 tablet by mouth every 6 hours as needed for Pain for up to 5 days. Intended supply: 5 days. Take lowest dose possible to manage pain 8/19/20 8/24/20 Yes Monse Rojas MD   ibuprofen (ADVIL;MOTRIN) 800 MG tablet Take 1 tablet by mouth every 8 hours as needed for Pain 8/19/20  Yes Monse Rojas MD   ondansetron (ZOFRAN ODT) 4 MG disintegrating tablet Take 1 tablet by mouth every 8 hours as needed for Nausea 8/19/20  Yes Monse Rojas MD   tamsulosin (FLOMAX) 0.4 MG capsule Take 1 capsule by mouth daily for 5 doses 8/19/20 8/24/20 Yes Monse Rojas MD   NONFORMULARY Med Name: Milton Ayala   Yes Historical Provider, MD   NONFORMULARY Med Name: Betty Mac   Yes Historical Provider, MD   Coenzyme Q10 (COQ10) 100 MG CAPS Take 100 mg by mouth daily   Yes Historical Provider, MD   atorvastatin (LIPITOR) 20 MG tablet Take 20 mg by mouth daily   Yes Historical Provider, MD   vitamin E 400 UNIT capsule Take 400 Units by mouth daily.    Yes Historical Provider, MD EXPOSURES: AK 3.6 mGy COMPARISON: CT abdomen and pelvis August 19, 2020 HISTORY: Intraprocedural imaging. Obstructive uropathy due to ureteral calculus FINDINGS: 5 spot images of the abdomen were obtained. Images demonstrate steps in placement of a retrograde left ureteral stent. Intraprocedural fluoroscopic spot images as above. See separate procedure report for more information. Assessment:    Active Problems:    Renal calculus    Ureteral calculus    UTI (urinary tract infection)    Hyponatremia  Resolved Problems:    * No resolved hospital problems.  *      Patient Active Problem List    Diagnosis Date Noted    UTI (urinary tract infection) 08/21/2020    Hyponatremia 08/21/2020    Renal calculus 08/19/2020    Ureteral calculus 08/19/2020       Plan:     · This patient requires inpatient admission because of UTI  · Factors affecting the medical complexity of this patient include Renal and ureteral Calculi, Hyponatremia  · Estimated length of stay is 2 days  · Appreciate urology  · NS at 125 ml/hr  · Up with Assist  · Rocephin 1 gm IV daily  · BC x 2  · CBC w/diff, BMP daily  · High risk medication monitoring: None    CORE MEASURES  DVT prophylaxis: Lovenox  Decubitus ulcer present on admission: No  CODE STATUS: FULL CODE  Nutrition Status: fair   Physical therapy: NA   Old Charts reviewed: Yes  EKG Reviewed: No  Advance Directive Addressed: Yes    TERRENCE Roberto, NP-C  8/21/2020, 11:35 AM

## 2020-08-21 NOTE — ED NOTES
Paged Dr Canela Dials via answering service, waiting for call back     Panfilo Bennett  08/20/20 2991

## 2020-08-21 NOTE — PROGRESS NOTES
Patient calls out for assistance to the bathroom. 2 RN's assist patient at this time. Pt gait is even but wobbly. Pt states \"I'm no where near as dizzy anymore. \" Pt able to void at this time. Pt cleaned up due to being rather diaphoretic earlier in the shift. Gown changed as well. Pt assisted with 1RN back to the chair and pt tolerants well. Pt neuro exam completed again and is WDL. Reassessment and vitals obtained as charted. Pt given chicken broth per request. Will continue to monitor.

## 2020-08-21 NOTE — ANESTHESIA POSTPROCEDURE EVALUATION
Department of Anesthesiology  Postprocedure Note    Patient: Rod Moore  MRN: [id number]  YOB: 1950  Date of evaluation: 8/21/2020  Time:  11:43 AM     Procedure Summary     Date:  08/21/20 Room / Location:  [address] [place] [address]    Anesthesia Start:  0825 Anesthesia Stop:  3189    Procedure:  [facility]  [address] [facility] [address] [facility] Diagnosis:  (KIDNEY STONE, AC CYSTITIS)    Surgeon:  Carlos Lovelace MD Responsible Provider:  TERRENCE Saldana CRNA    Anesthesia Type:  MAC ASA Status:  3          Anesthesia Type: MAC    Aguila Phase I:      Aguila Phase II: Aguila Score: 8    Last vitals: Reviewed and per EMR flowsheets.        Anesthesia Post Evaluation    Patient location during evaluation: PACU  Patient participation: complete - patient participated  Level of consciousness: awake and alert  Pain score: 0  Airway patency: patent  Nausea & Vomiting: no nausea and no vomiting  Complications: no  Cardiovascular status: blood pressure returned to baseline  Respiratory status: acceptable and room air  Hydration status: stable

## 2020-08-22 VITALS
WEIGHT: 213.85 LBS | BODY MASS INDEX: 36.51 KG/M2 | DIASTOLIC BLOOD PRESSURE: 71 MMHG | OXYGEN SATURATION: 96 % | HEART RATE: 75 BPM | TEMPERATURE: 96.7 F | RESPIRATION RATE: 16 BRPM | HEIGHT: 64 IN | SYSTOLIC BLOOD PRESSURE: 153 MMHG

## 2020-08-22 PROBLEM — E87.6 HYPOKALEMIA: Status: ACTIVE | Noted: 2020-08-22

## 2020-08-22 PROBLEM — E11.9 TYPE 2 DIABETES MELLITUS WITHOUT COMPLICATION (HCC): Status: ACTIVE | Noted: 2020-08-22

## 2020-08-22 LAB
ABSOLUTE EOS #: <0.03 K/UL (ref 0–0.44)
ABSOLUTE IMMATURE GRANULOCYTE: 0.06 K/UL (ref 0–0.3)
ABSOLUTE LYMPH #: 2.21 K/UL (ref 1.1–3.7)
ABSOLUTE MONO #: 0.87 K/UL (ref 0.1–1.2)
ANION GAP SERPL CALCULATED.3IONS-SCNC: 9 MMOL/L (ref 9–17)
BASOPHILS # BLD: 0 % (ref 0–2)
BASOPHILS ABSOLUTE: <0.03 K/UL (ref 0–0.2)
BUN BLDV-MCNC: 10 MG/DL (ref 8–23)
BUN/CREAT BLD: 19 (ref 9–20)
CALCIUM SERPL-MCNC: 8.6 MG/DL (ref 8.6–10.4)
CHLORIDE BLD-SCNC: 106 MMOL/L (ref 98–107)
CO2: 25 MMOL/L (ref 20–31)
CREAT SERPL-MCNC: 0.54 MG/DL (ref 0.5–0.9)
DIFFERENTIAL TYPE: ABNORMAL
EOSINOPHILS RELATIVE PERCENT: 0 % (ref 1–4)
GFR AFRICAN AMERICAN: >60 ML/MIN
GFR NON-AFRICAN AMERICAN: >60 ML/MIN
GFR SERPL CREATININE-BSD FRML MDRD: ABNORMAL ML/MIN/{1.73_M2}
GFR SERPL CREATININE-BSD FRML MDRD: ABNORMAL ML/MIN/{1.73_M2}
GLUCOSE BLD-MCNC: 126 MG/DL (ref 70–99)
HCT VFR BLD CALC: 33.6 % (ref 36.3–47.1)
HEMOGLOBIN: 10.9 G/DL (ref 11.9–15.1)
IMMATURE GRANULOCYTES: 1 %
LYMPHOCYTES # BLD: 23 % (ref 24–43)
MAGNESIUM: 2 MG/DL (ref 1.6–2.6)
MCH RBC QN AUTO: 31.4 PG (ref 25.2–33.5)
MCHC RBC AUTO-ENTMCNC: 32.4 G/DL (ref 28.4–34.8)
MCV RBC AUTO: 96.8 FL (ref 82.6–102.9)
MONOCYTES # BLD: 9 % (ref 3–12)
NRBC AUTOMATED: 0 PER 100 WBC
PDW BLD-RTO: 13.4 % (ref 11.8–14.4)
PLATELET # BLD: 224 K/UL (ref 138–453)
PLATELET ESTIMATE: ABNORMAL
PMV BLD AUTO: 10.2 FL (ref 8.1–13.5)
POTASSIUM SERPL-SCNC: 3.5 MMOL/L (ref 3.7–5.3)
RBC # BLD: 3.47 M/UL (ref 3.95–5.11)
RBC # BLD: ABNORMAL 10*6/UL
SEG NEUTROPHILS: 67 % (ref 36–65)
SEGMENTED NEUTROPHILS ABSOLUTE COUNT: 6.33 K/UL (ref 1.5–8.1)
SODIUM BLD-SCNC: 140 MMOL/L (ref 135–144)
WBC # BLD: 9.5 K/UL (ref 3.5–11.3)
WBC # BLD: ABNORMAL 10*3/UL

## 2020-08-22 PROCEDURE — 80048 BASIC METABOLIC PNL TOTAL CA: CPT

## 2020-08-22 PROCEDURE — 83735 ASSAY OF MAGNESIUM: CPT

## 2020-08-22 PROCEDURE — 2580000003 HC RX 258: Performed by: UROLOGY

## 2020-08-22 PROCEDURE — 85025 COMPLETE CBC W/AUTO DIFF WBC: CPT

## 2020-08-22 PROCEDURE — 36415 COLL VENOUS BLD VENIPUNCTURE: CPT

## 2020-08-22 PROCEDURE — 6370000000 HC RX 637 (ALT 250 FOR IP): Performed by: NURSE PRACTITIONER

## 2020-08-22 RX ORDER — POTASSIUM CHLORIDE 20 MEQ/1
20 TABLET, EXTENDED RELEASE ORAL DAILY
Qty: 30 TABLET | Refills: 5 | Status: SHIPPED | OUTPATIENT
Start: 2020-08-22

## 2020-08-22 RX ORDER — CEPHALEXIN 500 MG/1
500 CAPSULE ORAL 3 TIMES DAILY
Qty: 30 CAPSULE | Refills: 0 | Status: SHIPPED | OUTPATIENT
Start: 2020-08-22 | End: 2020-09-01

## 2020-08-22 RX ADMIN — SODIUM CHLORIDE: 9 INJECTION, SOLUTION INTRAVENOUS at 03:08

## 2020-08-22 RX ADMIN — TAMSULOSIN HYDROCHLORIDE 0.4 MG: 0.4 CAPSULE ORAL at 11:59

## 2020-08-22 RX ADMIN — METOPROLOL TARTRATE 25 MG: 25 TABLET, FILM COATED ORAL at 11:59

## 2020-08-22 ASSESSMENT — PAIN SCALES - GENERAL
PAINLEVEL_OUTOF10: 0

## 2020-08-22 NOTE — PLAN OF CARE
Problem: Falls - Risk of:  Goal: Will remain free from falls  Description: Will remain free from falls  Outcome: Ongoing  Note: Pt bed in lowest position with wheels locked. Call light within reach. Bed alarm in place. Room remains free of obstacles. Pt reminded to use call light as needed, verbalizes understanding. Will continue to monitor. Problem: Daily Care:  Goal: Daily care needs are met  Description: Daily care needs are met  Outcome: Ongoing  Note: Pt is able to provide self care with minimal assistance. Problem: Pain:  Goal: Patient's pain/discomfort is manageable  Description: Patient's pain/discomfort is manageable  Outcome: Ongoing  Note: Pain assessments provided with interventions as appropriate. Problem: Skin Integrity:  Goal: Skin integrity will stabilize  Description: Skin integrity will stabilize  Outcome: Ongoing  Note: Pt turns and repositions. Skin assessment done as charted. Problem: Fluid Volume:  Goal: Will show no signs or symptoms of fluid imbalance  Description: Will show no signs or symptoms of fluid imbalance  Outcome: Ongoing  Note: IVF infusing. Output recorded. Problem: Urinary Elimination:  Goal: Ability to achieve and maintain adequate urine output will be supported  Description: Ability to achieve and maintain adequate urine output will be supported  Outcome: Ongoing  Note: Urine output recorded in flow sheet.

## 2020-08-22 NOTE — PROGRESS NOTES
Progress Note  Obey Torres MD    SUBJECTIVE: Patient is seen for Active Problems:    Renal calculus    Ureteral calculus    UTI (urinary tract infection)    Hyponatremia    Hypokalemia  Resolved Problems:    * No resolved hospital problems. *     patient better  No pain  k lower- 3.5    OBJECTIVE:    Vitals:   Vitals:    08/22/20 0730   BP: (!) 153/71   Pulse: 75   Resp: 16   Temp: 96.7 °F (35.9 °C)   SpO2: 96%     Weight: 213 lb 13.5 oz (97 kg)   Height: 5' 4\" (162.6 cm)   -----------------------------------------------------------------  Exam:    CONSTITUTIONAL:  awake, alert, cooperative, no apparent distress, and appears stated age  EYES:  Lids and lashes normal, pupils equal, round and reactive to light, extra ocular muscles intact, sclera clear, conjunctiva normal  ENT:  normocepalic, without obvious abnormality  NECK:  Supple, symmetrical, trachea midline, no adenopathy, thyroid symmetric, not enlarged and no tenderness, skin normal  HEMATOLOGIC/LYMPHATICS:  no cervical lymphadenopathy  LUNGS:  No increased work of breathing, good air exchange, clear to auscultation bilaterally, no crackles or wheezing  CARDIOVASCULAR:  Normal apical impulse, regular rate and rhythm, normal S1 and S2, no S3 or S4, and no murmur noted  ABDOMEN:  No scars, normal bowel sounds, soft, non-distended, non-tender, no masses palpated, no hepatosplenomegally    MUSCULOSKELETAL:  there is no redness, warmth, or swelling of the joints  NEUROLOGIC:  Awake, alert, oriented to name, place and time. Cranial nerves II-XII are grossly intact. Motor is 5 out of 5 bilaterally.    SKIN:  no bruising or bleeding  EXT:     no cyanosis, clubbing or edema present    -----------------------------------------------------------------  Diagnostic Data: Reviewed    More Recent Labs:    Results for orders placed or performed during the hospital encounter of 08/20/20   Culture, Blood 1    Specimen: Blood   Result Value Ref Range    Specimen Description . BLOOD     Special Requests L HAND 4ML     Culture NO GROWTH 2 DAYS    Urinalysis Reflex to Culture    Specimen: Urine, clean catch   Result Value Ref Range    Color, UA YELLOW YELLOW    Turbidity UA CLEAR CLEAR    Glucose, Ur NEGATIVE NEGATIVE    Bilirubin Urine NEGATIVE NEGATIVE    Ketones, Urine NEGATIVE NEGATIVE    Specific Gravity, UA >1.030 (H) 1.010 - 1.020    Urine Hgb 3+ (A) NEGATIVE    pH, UA 5.5 5.0 - 9.0    Protein, UA TRACE (A) NEGATIVE    Urobilinogen, Urine Normal Normal    Nitrite, Urine NEGATIVE NEGATIVE    Leukocyte Esterase, Urine MODERATE (A) NEGATIVE    Urinalysis Comments NOT REPORTED    Microscopic Urinalysis   Result Value Ref Range    -          WBC, UA 5 TO 10 0 - 5 /HPF    RBC, UA 5 TO 10 0 - 2 /HPF    Casts UA NOT REPORTED /LPF    Crystals, UA CALCIUM OXALATE (A) None /HPF    Crystals, UA 5 TO 10 (A) None /HPF    Epithelial Cells UA 2 TO 5 0 - 25 /HPF    Renal Epithelial, UA NOT REPORTED 0 /HPF    Bacteria, UA 2+ (A) None    Mucus, UA TRACE (A) None    Trichomonas, UA NOT REPORTED None    Amorphous, UA NOT REPORTED None    Other Observations UA NOT REPORTED NOT REQ.     Yeast, UA NOT REPORTED None   CBC auto differential   Result Value Ref Range    WBC 16.5 (H) 3.5 - 11.3 k/uL    RBC 3.81 (L) 3.95 - 5.11 m/uL    Hemoglobin 12.2 11.9 - 15.1 g/dL    Hematocrit 35.7 (L) 36.3 - 47.1 %    MCV 93.7 82.6 - 102.9 fL    MCH 32.0 25.2 - 33.5 pg    MCHC 34.2 28.4 - 34.8 g/dL    RDW 13.2 11.8 - 14.4 %    Platelets 572 033 - 591 k/uL    MPV 10.8 8.1 - 13.5 fL    NRBC Automated 0.0 0.0 per 100 WBC    Differential Type NOT REPORTED     WBC Morphology NOT REPORTED     RBC Morphology NOT REPORTED     Platelet Estimate NOT REPORTED     Seg Neutrophils 63 36 - 65 %    Lymphocytes 23 (L) 24 - 43 %    Monocytes 13 (H) 3 - 12 %    Eosinophils % 1 1 - 4 %    Immature Granulocytes 0 0 %    Basophils 0 0 - 2 %    Segs Absolute 10.38 (H) 1.50 - 8.10 k/uL    Absolute Lymph # 3.80 (H) 1.10 - 3.70 k/uL Absolute Mono # 2.15 (H) 0.10 - 1.20 k/uL    Absolute Eos # 0.17 0.00 - 0.44 k/uL    Absolute Immature Granulocyte 0.00 0.00 - 0.30 k/uL    Basophils Absolute 0.00 0.0 - 0.2 k/uL    Morphology Normal    Basic Metabolic Panel   Result Value Ref Range    Glucose 143 (H) 70 - 99 mg/dL    BUN 23 8 - 23 mg/dL    CREATININE 0.98 (H) 0.50 - 0.90 mg/dL    Bun/Cre Ratio 23 (H) 9 - 20    Calcium 9.8 8.6 - 10.4 mg/dL    Sodium 131 (L) 135 - 144 mmol/L    Potassium 3.8 3.7 - 5.3 mmol/L    Chloride 94 (L) 98 - 107 mmol/L    CO2 24 20 - 31 mmol/L    Anion Gap 13 9 - 17 mmol/L    GFR Non-African American 56 (L) >60 mL/min    GFR African American >60 >60 mL/min    GFR Comment          GFR Staging         Lactic acid, plasma   Result Value Ref Range    Lactic Acid 1.0 0.5 - 2.2 mmol/L    Lactic Acid, Whole Blood NOT REPORTED 0.7 - 2.1 mmol/L   CBC   Result Value Ref Range    WBC 9.9 3.5 - 11.3 k/uL    RBC 3.59 (L) 3.95 - 5.11 m/uL    Hemoglobin 11.1 (L) 11.9 - 15.1 g/dL    Hematocrit 34.7 (L) 36.3 - 47.1 %    MCV 96.7 82.6 - 102.9 fL    MCH 30.9 25.2 - 33.5 pg    MCHC 32.0 28.4 - 34.8 g/dL    RDW 13.2 11.8 - 14.4 %    Platelets 375 725 - 403 k/uL    MPV 9.9 8.1 - 13.5 fL    NRBC Automated 0.0 0.0 per 100 WBC   Comprehensive Metabolic Panel w/ Reflex to MG   Result Value Ref Range    Glucose 180 (H) 70 - 99 mg/dL    BUN 18 8 - 23 mg/dL    CREATININE 0.72 0.50 - 0.90 mg/dL    Bun/Cre Ratio 25 (H) 9 - 20    Calcium 8.7 8.6 - 10.4 mg/dL    Sodium 128 (L) 135 - 144 mmol/L    Potassium 4.2 3.7 - 5.3 mmol/L    Chloride 96 (L) 98 - 107 mmol/L    CO2 24 20 - 31 mmol/L    Anion Gap 8 (L) 9 - 17 mmol/L    Alkaline Phosphatase 62 35 - 104 U/L    ALT 29 5 - 33 U/L    AST 23 <32 U/L    Total Bilirubin 1.37 (H) 0.3 - 1.2 mg/dL    Total Protein 6.0 (L) 6.4 - 8.3 g/dL    Alb 3.3 (L) 3.5 - 5.2 g/dL    Albumin/Globulin Ratio 1.2 1.0 - 2.5    GFR Non-African American >60 >60 mL/min    GFR African American >60 >60 mL/min    GFR Comment          GFR Staging         Hemoglobin A1c   Result Value Ref Range    Hemoglobin A1C 7.0 (H) 4.0 - 6.0 %    Estimated Avg Glucose 154 mg/dL   CBC Auto Differential   Result Value Ref Range    WBC 9.5 3.5 - 11.3 k/uL    RBC 3.47 (L) 3.95 - 5.11 m/uL    Hemoglobin 10.9 (L) 11.9 - 15.1 g/dL    Hematocrit 33.6 (L) 36.3 - 47.1 %    MCV 96.8 82.6 - 102.9 fL    MCH 31.4 25.2 - 33.5 pg    MCHC 32.4 28.4 - 34.8 g/dL    RDW 13.4 11.8 - 14.4 %    Platelets 713 886 - 515 k/uL    MPV 10.2 8.1 - 13.5 fL    NRBC Automated 0.0 0.0 per 100 WBC    Differential Type NOT REPORTED     WBC Morphology NOT REPORTED     RBC Morphology NOT REPORTED     Platelet Estimate NOT REPORTED     Seg Neutrophils 67 (H) 36 - 65 %    Lymphocytes 23 (L) 24 - 43 %    Monocytes 9 3 - 12 %    Eosinophils % 0 (L) 1 - 4 %    Basophils 0 0 - 2 %    Immature Granulocytes 1 (H) 0 %    Segs Absolute 6.33 1.50 - 8.10 k/uL    Absolute Lymph # 2.21 1.10 - 3.70 k/uL    Absolute Mono # 0.87 0.10 - 1.20 k/uL    Absolute Eos # <0.03 0.00 - 0.44 k/uL    Basophils Absolute <0.03 0.00 - 0.20 k/uL    Absolute Immature Granulocyte 0.06 0.00 - 0.30 k/uL   Basic Metabolic Panel w/ Reflex to MG   Result Value Ref Range    Glucose 126 (H) 70 - 99 mg/dL    BUN 10 8 - 23 mg/dL    CREATININE 0.54 0.50 - 0.90 mg/dL    Bun/Cre Ratio 19 9 - 20    Calcium 8.6 8.6 - 10.4 mg/dL    Sodium 140 135 - 144 mmol/L    Potassium 3.5 (L) 3.7 - 5.3 mmol/L    Chloride 106 98 - 107 mmol/L    CO2 25 20 - 31 mmol/L    Anion Gap 9 9 - 17 mmol/L    GFR Non-African American >60 >60 mL/min    GFR African American >60 >60 mL/min    GFR Comment          GFR Staging             ASSESSMENT:   Patient Active Problem List    Diagnosis Date Noted    Hypokalemia 08/22/2020    UTI (urinary tract infection) 08/21/2020    Hyponatremia 08/21/2020    Renal calculus 08/19/2020    Ureteral calculus 08/19/2020         PLAN:  · D/c home on oral keflex and kcl  · F/u with DR MORGAN and pcp in 1 wk      Huy Velasquez M.D.

## 2020-08-22 NOTE — PROGRESS NOTES
Writer rechecked patient's VS. BP noted to be 189/76. Pt assisted to bathroom using contact guard assist. Pt urinated, output recorded in flow sheet. While in bathroom patient had some complaints of dizziness. No nausea, no headache, no other complaints. Writer called second nurse to assist patient back to bed. Pt did appear pale for a couple minutes but color returned quickly. VS rechecked as charted. /83. Medications administered as ordered. Pt requesting prn Glycolax and writer administered as well. Pt reminded to use call light. Call light within reach. Will use bedside commode next time patient needs to urinate to ensure no dizziness before getting up to bathroom again. Will continue to monitor.

## 2020-08-22 NOTE — DISCHARGE SUMMARY
Physician Discharge Summary  Obey Torres MD       Patient ID:  Prosper Teresa  [id number]  1950    Admission date: 8/20/2020    Discharge date: 8/22/2020     Admitting Physician: Mary Gary MD     Primary Care Physician: Shubham Ying MD     Primary Discharge Diagnoses:   Patient Active Problem List    Diagnosis Date Noted    Hypokalemia 08/22/2020    [facility] 36/77/0594    UTI (urinary tract infection) 08/21/2020    Hyponatremia 08/21/2020    Renal calculus 08/19/2020    Ureteral calculus 08/19/2020       Additional Diagnoses:       Diagnosis Date    Acid reflux     Arthritis     High cholesterol     High triglycerides     Hypertension     Kidney stone        Physical exam:      -----------------------------------------------------------------  Exam:  GEN:   A & O x3, no apparent distress  EYES: No gross abnormalities. and PERRL  NECK: normal, supple, no lymphadenopathy,  no carotid bruits  PULM: clear to auscultation bilaterally- no wheezes, rales or rhonchi, normal air movement, no respiratory distress  COR: regular rate & rhythm, no murmurs and no gallops  ABD:  soft, non-tender, non-distended, normal bowel sounds, no masses or organomegaly  EXT:   no cyanosis, clubbing or edema present    NEURO: negative  SKIN:  no rashes or significant lesions  -----------------------------------------------------------------          Hospital Course: The patient was admitted for the above. She was treated with iv fluids,antibiotics,pain meds, had cysto and stent and improved over the course of her hospitalization.   Her k was low ans atarted on kcl    Consultants:    · urology    Procedures:    · Cysto and stent placement    Complications:   · none    Significant Diagnostic Studies:   Ct Abdomen Pelvis Wo Contrast    Result Date: 8/19/2020  EXAMINATION: CT OF THE ABDOMEN AND PELVIS WITHOUT CONTRAST 8/19/2020 2:32 am TECHNIQUE: CT of the abdomen and pelvis was performed without the administration of intravenous contrast. Multiplanar reformatted images are provided for review. Dose modulation, iterative reconstruction, and/or weight based adjustment of the mA/kV was utilized to reduce the radiation dose to as low as reasonably achievable. COMPARISON: None. HISTORY: ORDERING SYSTEM PROVIDED HISTORY: L flank pain. Concern for kidney stone. TECHNOLOGIST PROVIDED HISTORY: L flank pain. Concern for kidney stone. FINDINGS: Lower Chest: Small hiatal hernia. Small right lower lobe calcified pulmonary granulomas. Visualized lung bases demonstrate no focal consolidation or mass. No cardiomegaly, pericardial or pleural effusion. Liver: Normal. Gallbladder and Bile Ducts: Normal. Spleen: Normal. Adrenal Glands: Normal. Pancreas: Normal. Genitourinary: Mild left hydronephrosis secondary to a 4-5 mm stone in the proximal left ureter. No right-sided hydronephrosis. Punctate nonobstructing stone in the superior right kidney. Multiple nonobstructing stones in the left kidney, the largest in the inferior portion measuring up to 5 mm. Urinary bladder is decompressed and not well evaluated. Prior hysterectomy. Bowel: No acute abnormality. Normal caliber bowel. Minimal colonic diverticulosis without acute diverticulitis. Vasculature: Atherosclerosis. No abdominal aortic aneurysm. Bones and Soft Tissues: Degenerative osseous changes in the visualized spine and pelvis. Retroperitoneum/Mesentery: No intraperitoneal free air, ascites or fluid collection. No lymphadenopathy in the abdomen or pelvis. Mild left hydronephrosis secondary to a 4-5 mm stone in the proximal left ureter. Multiple bilateral nonobstructing kidney stones, left greater than right. Xr Abdomen (kub) (single Ap View)    Result Date: 8/21/2020  EXAMINATION: SPOT FLUOROSCOPIC IMAGES 8/21/2020 8:44 am TECHNIQUE: Fluoroscopy was provided by the radiology department for procedure.  Radiologist was not present during examination. FLUOROSCOPY DOSE AND TYPE OR TIME AND EXPOSURES: AK 3.6 mGy COMPARISON: CT abdomen and pelvis August 19, 2020 HISTORY: Intraprocedural imaging. Obstructive uropathy due to ureteral calculus FINDINGS: 5 spot images of the abdomen were obtained. Images demonstrate steps in placement of a retrograde left ureteral stent. Intraprocedural fluoroscopic spot images as above. See separate procedure report for more information.      Recent Results (from the past 96 hour(s))   CBC Auto Differential    Collection Time: 08/19/20  4:10 AM   Result Value Ref Range    WBC 11.5 (H) 3.5 - 11.3 k/uL    RBC 4.36 3.95 - 5.11 m/uL    Hemoglobin 14.0 11.9 - 15.1 g/dL    Hematocrit 47.4 (H) 36.3 - 47.1 %    .7 (H) 82.6 - 102.9 fL    MCH 32.1 25.2 - 33.5 pg    MCHC 29.5 28.4 - 34.8 g/dL    RDW 13.7 11.8 - 14.4 %    Platelets See Reflexed IPF Result 138 - 453 k/uL    MPV NOT REPORTED 8.1 - 13.5 fL    NRBC Automated 0.0 0.0 per 100 WBC    Differential Type NOT REPORTED     WBC Morphology NOT REPORTED     RBC Morphology NOT REPORTED     Platelet Estimate NOT REPORTED     Seg Neutrophils 71 (H) 36 - 65 %    Lymphocytes 24 24 - 43 %    Monocytes 5 3 - 12 %    Eosinophils % 0 (L) 1 - 4 %    Immature Granulocytes 0 0 %    Basophils 0 0 - 2 %    Segs Absolute 8.16 (H) 1.50 - 8.10 k/uL    Absolute Lymph # 2.76 1.10 - 3.70 k/uL    Absolute Mono # 0.58 0.10 - 1.20 k/uL    Absolute Eos # 0.00 0.00 - 0.44 k/uL    Absolute Immature Granulocyte 0.00 0.00 - 0.30 k/uL    Basophils Absolute 0.00 0.0 - 0.2 k/uL    Morphology Platelet scan shows Normal Platelets    Comprehensive Metabolic Panel w/ Reflex to MG    Collection Time: 08/19/20  4:10 AM   Result Value Ref Range    Glucose 201 (H) 70 - 99 mg/dL    BUN 20 8 - 23 mg/dL    CREATININE 0.91 (H) 0.50 - 0.90 mg/dL    Bun/Cre Ratio 22 (H) 9 - 20    Calcium 8.9 8.6 - 10.4 mg/dL    Sodium 136 135 - 144 mmol/L    Potassium 3.7 3.7 - 5.3 mmol/L    Chloride 103 98 - 107 mmol/L CO2 16 (L) 20 - 31 mmol/L    Anion Gap 17 mmol/L    Alkaline Phosphatase 79 35 - 104 U/L    ALT 42 (H) 5 - 33 U/L    AST 24 <32 U/L    Total Bilirubin 0.90 0.3 - 1.2 mg/dL    Total Protein 6.7 6.4 - 8.3 g/dL    Alb 3.4 (L) 3.5 - 5.2 g/dL    Albumin/Globulin Ratio 1.0 1.0 - 2.5    GFR Non-African American >60 >60 mL/min    GFR African American >60 >60 mL/min    GFR Comment          GFR Staging         Immature Platelet Fraction    Collection Time: 08/19/20  4:10 AM   Result Value Ref Range    Platelet, Fluorescence NOT REPORTED 138 - 453 k/uL   Urinalysis, reflex to microscopic    Collection Time: 08/19/20  4:55 AM   Result Value Ref Range    Color, UA YELLOW YELLOW    Turbidity UA SLIGHTLY CLOUDY (A) CLEAR    Glucose, Ur NEGATIVE NEGATIVE    Bilirubin Urine NEGATIVE NEGATIVE    Ketones, Urine 1+ (A) NEGATIVE    Specific Gravity, UA >1.030 (H) 1.010 - 1.020    Urine Hgb 3+ (A) NEGATIVE    pH, UA 6.0 5.0 - 9.0    Protein, UA 1+ (A) NEGATIVE    Urobilinogen, Urine Normal Normal    Nitrite, Urine NEGATIVE NEGATIVE    Leukocyte Esterase, Urine SMALL (A) NEGATIVE    Urinalysis Comments NOT REPORTED    Urine Culture    Collection Time: 08/19/20  4:55 AM    Specimen: Urine, clean catch   Result Value Ref Range    Specimen Description . CLEAN CATCH URINE     Special Requests NOT REPORTED     Culture NO SIGNIFICANT GROWTH    Microscopic Urinalysis    Collection Time: 08/19/20  4:55 AM   Result Value Ref Range    -          WBC, UA 2 TO 5 0 - 5 /HPF    RBC, UA 50  0 - 2 /HPF    Casts UA NOT REPORTED /LPF    Crystals, UA NOT REPORTED None /HPF    Epithelial Cells UA 5 TO 10 0 - 25 /HPF    Renal Epithelial, UA NOT REPORTED 0 /HPF    Bacteria, UA TRACE (A) None    Mucus, UA NOT REPORTED None    Trichomonas, UA NOT REPORTED None    Amorphous, UA NOT REPORTED None    Other Observations UA NOT REPORTED NOT REQ.     Yeast, UA NOT REPORTED None   Urinalysis Reflex to Culture    Collection Time: 08/20/20  8:00 PM    Specimen: Urine, clean catch   Result Value Ref Range    Color, UA YELLOW YELLOW    Turbidity UA CLEAR CLEAR    Glucose, Ur NEGATIVE NEGATIVE    Bilirubin Urine NEGATIVE NEGATIVE    Ketones, Urine NEGATIVE NEGATIVE    Specific Gravity, UA >1.030 (H) 1.010 - 1.020    Urine Hgb 3+ (A) NEGATIVE    pH, UA 5.5 5.0 - 9.0    Protein, UA TRACE (A) NEGATIVE    Urobilinogen, Urine Normal Normal    Nitrite, Urine NEGATIVE NEGATIVE    Leukocyte Esterase, Urine MODERATE (A) NEGATIVE    Urinalysis Comments NOT REPORTED    Microscopic Urinalysis    Collection Time: 08/20/20  8:00 PM   Result Value Ref Range    -          WBC, UA 5 TO 10 0 - 5 /HPF    RBC, UA 5 TO 10 0 - 2 /HPF    Casts UA NOT REPORTED /LPF    Crystals, UA CALCIUM OXALATE (A) None /HPF    Crystals, UA 5 TO 10 (A) None /HPF    Epithelial Cells UA 2 TO 5 0 - 25 /HPF    Renal Epithelial, UA NOT REPORTED 0 /HPF    Bacteria, UA 2+ (A) None    Mucus, UA TRACE (A) None    Trichomonas, UA NOT REPORTED None    Amorphous, UA NOT REPORTED None    Other Observations UA NOT REPORTED NOT REQ.     Yeast, UA NOT REPORTED None   CBC auto differential    Collection Time: 08/20/20  9:55 PM   Result Value Ref Range    WBC 16.5 (H) 3.5 - 11.3 k/uL    RBC 3.81 (L) 3.95 - 5.11 m/uL    Hemoglobin 12.2 11.9 - 15.1 g/dL    Hematocrit 35.7 (L) 36.3 - 47.1 %    MCV 93.7 82.6 - 102.9 fL    MCH 32.0 25.2 - 33.5 pg    MCHC 34.2 28.4 - 34.8 g/dL    RDW 13.2 11.8 - 14.4 %    Platelets 049 252 - 860 k/uL    MPV 10.8 8.1 - 13.5 fL    NRBC Automated 0.0 0.0 per 100 WBC    Differential Type NOT REPORTED     WBC Morphology NOT REPORTED     RBC Morphology NOT REPORTED     Platelet Estimate NOT REPORTED     Seg Neutrophils 63 36 - 65 %    Lymphocytes 23 (L) 24 - 43 %    Monocytes 13 (H) 3 - 12 %    Eosinophils % 1 1 - 4 %    Immature Granulocytes 0 0 %    Basophils 0 0 - 2 %    Segs Absolute 10.38 (H) 1.50 - 8.10 k/uL    Absolute Lymph # 3.80 (H) 1.10 - 3.70 k/uL    Absolute Mono # 2.15 (H) 0.10 - 1.20 k/uL 29 5 - 33 U/L    AST 23 <32 U/L    Total Bilirubin 1.37 (H) 0.3 - 1.2 mg/dL    Total Protein 6.0 (L) 6.4 - 8.3 g/dL    Alb 3.3 (L) 3.5 - 5.2 g/dL    Albumin/Globulin Ratio 1.2 1.0 - 2.5    GFR Non-African American >60 >60 mL/min    GFR African American >60 >60 mL/min    GFR Comment          GFR Staging         Hemoglobin A1c    Collection Time: 08/21/20  5:51 AM   Result Value Ref Range    Hemoglobin A1C 7.0 (H) 4.0 - 6.0 %    Estimated Avg Glucose 154 mg/dL   CBC Auto Differential    Collection Time: 08/22/20  5:40 AM   Result Value Ref Range    WBC 9.5 3.5 - 11.3 k/uL    RBC 3.47 (L) 3.95 - 5.11 m/uL    Hemoglobin 10.9 (L) 11.9 - 15.1 g/dL    Hematocrit 33.6 (L) 36.3 - 47.1 %    MCV 96.8 82.6 - 102.9 fL    MCH 31.4 25.2 - 33.5 pg    MCHC 32.4 28.4 - 34.8 g/dL    RDW 13.4 11.8 - 14.4 %    Platelets 668 285 - 832 k/uL    MPV 10.2 8.1 - 13.5 fL    NRBC Automated 0.0 0.0 per 100 WBC    Differential Type NOT REPORTED     WBC Morphology NOT REPORTED     RBC Morphology NOT REPORTED     Platelet Estimate NOT REPORTED     Seg Neutrophils 67 (H) 36 - 65 %    Lymphocytes 23 (L) 24 - 43 %    Monocytes 9 3 - 12 %    Eosinophils % 0 (L) 1 - 4 %    Basophils 0 0 - 2 %    Immature Granulocytes 1 (H) 0 %    Segs Absolute 6.33 1.50 - 8.10 k/uL    Absolute Lymph # 2.21 1.10 - 3.70 k/uL    Absolute Mono # 0.87 0.10 - 1.20 k/uL    Absolute Eos # <0.03 0.00 - 0.44 k/uL    Basophils Absolute <0.03 0.00 - 0.20 k/uL    Absolute Immature Granulocyte 0.06 0.00 - 0.30 k/uL   Basic Metabolic Panel w/ Reflex to MG    Collection Time: 08/22/20  5:40 AM   Result Value Ref Range    Glucose 126 (H) 70 - 99 mg/dL    BUN 10 8 - 23 mg/dL    CREATININE 0.54 0.50 - 0.90 mg/dL    Bun/Cre Ratio 19 9 - 20    Calcium 8.6 8.6 - 10.4 mg/dL    Sodium 140 135 - 144 mmol/L    Potassium 3.5 (L) 3.7 - 5.3 mmol/L    Chloride 106 98 - 107 mmol/L    CO2 25 20 - 31 mmol/L    Anion Gap 9 9 - 17 mmol/L    GFR Non-African American >60 >60 mL/min    GFR African American >60 >60 mL/min    GFR Comment          GFR Staging           ·     Discharge Condition:   · stable    Disposition:   · home    Discharge Medications:     Gi Polk   Home Medication Instructions [id number]    Printed on:08/22/20 [id number]   Medication Information                      aspirin 81 MG EC tablet  Take 81 mg by mouth daily. atorvastatin (LIPITOR) 20 MG tablet  Take 20 mg by mouth daily             Biotin 5000 MCG CAPS  Take  by mouth. Calcium-Vitamin D (CALTRATE 600 PLUS-VIT D PO)  Take 600 mg by mouth daily. cephALEXin (KEFLEX) 500 MG capsule  Take 1 capsule by mouth 3 times daily for 10 days             Cholecalciferol (VITAMIN D3) 2000 UNITS CAPS  Take  by mouth. Coenzyme Q10 (COQ10) 100 MG CAPS  Take 100 mg by mouth daily             HYDROcodone-acetaminophen (NORCO) 5-325 MG per tablet  Take 1 tablet by mouth every 6 hours as needed for Pain for up to 5 days. Intended supply: 5 days. Take lowest dose possible to manage pain             metoprolol tartrate (LOPRESSOR) 25 MG tablet  Take 25 mg by mouth daily             NONFORMULARY  Med Name: Emilykenya Henderson JOINT             NONFORMULARY  Med Name: Ar Schulte             Omega-3 Fatty Acids (OMEGA 3 PO)  Take  by mouth. omeprazole (PRILOSEC) 10 MG capsule  Take 10 mg by mouth daily. ondansetron (ZOFRAN ODT) 4 MG disintegrating tablet  Take 1 tablet by mouth every 8 hours as needed for Nausea             potassium chloride (KLOR-CON M) 20 MEQ extended release tablet  Take 1 tablet by mouth daily             prednisoLONE acetate (PRED FORTE) 1 % ophthalmic suspension               predniSONE (DELTASONE) 10 MG tablet  Take 10 mg by mouth nightly              tamsulosin (FLOMAX) 0.4 MG capsule  Take 1 capsule by mouth daily for 5 doses             therapeutic multivitamin-minerals (THERAGRAN-M) tablet  Take 1 tablet by mouth daily.                vitamin E 400 UNIT capsule  Take 400 Units by mouth daily. · Resume all home medications unless otherwise directed  · Add keflex and kcl  · Stop taking ibuprofen    Patient Instructions:   · Activity: activity as tolerated  · Diet: diabetic diet  · Wound Care: none needed  · Other:   · Follow up with Dr Tahira Dias and DR MORGAN in 1 wk as directed      Time Spent on discharge services is 25 minutes in the examination, evaluation, counseling and review of medications and discharge plan.     Signed:  Coty Mccall M.D.  8/22/2020  8:42 AM

## 2020-08-22 NOTE — DISCHARGE INSTR - DIET

## 2020-08-22 NOTE — PROGRESS NOTES
Discharge instructions given to pt and family. No questions, pt verbalized understanding all instructions. Pt aware of follow up appointments as listed on AVS. Pt d/c'd off unit at this time, via wheelchair, with spouse, to home. Belongings in hand. No issues noted.

## 2020-08-24 DIAGNOSIS — Z01.818 PREOP TESTING: Primary | ICD-10-CM

## 2020-08-24 NOTE — PROGRESS NOTES
Patient instructed on the pre-operative, intra-operative, and post-operative process. Patient's surgical procedure and day of surgery confirmed. Patient instructed on NPO status. Medication instructions reviewed with patient. Pre operative instruction sheet reviewed with patient per PAT phone interview. Appointment time and instructions for pre-op COVID testing given to patient.

## 2020-08-25 ENCOUNTER — HOSPITAL ENCOUNTER (OUTPATIENT)
Dept: PREADMISSION TESTING | Age: 70
Setting detail: SPECIMEN
Discharge: HOME | End: 2020-08-29
Payer: MEDICARE

## 2020-08-25 LAB
CULTURE: NORMAL
Lab: NORMAL
SPECIMEN DESCRIPTION: NORMAL

## 2020-08-25 PROCEDURE — C9803 HOPD COVID-19 SPEC COLLECT: HCPCS

## 2020-08-25 PROCEDURE — U0003 INFECTIOUS AGENT DETECTION BY NUCLEIC ACID (DNA OR RNA); SEVERE ACUTE RESPIRATORY SYNDROME CORONAVIRUS 2 (SARS-COV-2) (CORONAVIRUS DISEASE [COVID-19]), AMPLIFIED PROBE TECHNIQUE, MAKING USE OF HIGH THROUGHPUT TECHNOLOGIES AS DESCRIBED BY CMS-2020-01-R: HCPCS

## 2020-08-26 ENCOUNTER — ANESTHESIA EVENT (OUTPATIENT)
Dept: OPERATING ROOM | Age: 70
End: 2020-08-26
Payer: MEDICARE

## 2020-08-27 ENCOUNTER — ANESTHESIA (OUTPATIENT)
Dept: OPERATING ROOM | Age: 70
End: 2020-08-27
Payer: MEDICARE

## 2020-08-27 ENCOUNTER — APPOINTMENT (OUTPATIENT)
Dept: GENERAL RADIOLOGY | Age: 70
End: 2020-08-27
Attending: UROLOGY
Payer: MEDICARE

## 2020-08-27 ENCOUNTER — HOSPITAL ENCOUNTER (OUTPATIENT)
Age: 70
Setting detail: OUTPATIENT SURGERY
Discharge: HOME HEALTH | End: 2020-08-27
Attending: UROLOGY | Admitting: UROLOGY
Payer: MEDICARE

## 2020-08-27 VITALS
OXYGEN SATURATION: 96 % | HEART RATE: 68 BPM | SYSTOLIC BLOOD PRESSURE: 174 MMHG | DIASTOLIC BLOOD PRESSURE: 60 MMHG | BODY MASS INDEX: 34.31 KG/M2 | RESPIRATION RATE: 18 BRPM | HEIGHT: 64 IN | WEIGHT: 201 LBS | TEMPERATURE: 96.9 F

## 2020-08-27 VITALS
TEMPERATURE: 95.5 F | RESPIRATION RATE: 6 BRPM | OXYGEN SATURATION: 98 % | SYSTOLIC BLOOD PRESSURE: 104 MMHG | DIASTOLIC BLOOD PRESSURE: 47 MMHG

## 2020-08-27 LAB — SARS-COV-2, NAA: NOT DETECTED

## 2020-08-27 PROCEDURE — 3700000000 HC ANESTHESIA ATTENDED CARE: Performed by: UROLOGY

## 2020-08-27 PROCEDURE — 6370000000 HC RX 637 (ALT 250 FOR IP): Performed by: UROLOGY

## 2020-08-27 PROCEDURE — 2500000003 HC RX 250 WO HCPCS: Performed by: NURSE ANESTHETIST, CERTIFIED REGISTERED

## 2020-08-27 PROCEDURE — 6360000002 HC RX W HCPCS: Performed by: NURSE ANESTHETIST, CERTIFIED REGISTERED

## 2020-08-27 PROCEDURE — 3600000014 HC SURGERY LEVEL 4 ADDTL 15MIN: Performed by: UROLOGY

## 2020-08-27 PROCEDURE — 7100000010 HC PHASE II RECOVERY - FIRST 15 MIN: Performed by: UROLOGY

## 2020-08-27 PROCEDURE — 7100000000 HC PACU RECOVERY - FIRST 15 MIN: Performed by: UROLOGY

## 2020-08-27 PROCEDURE — 7100000011 HC PHASE II RECOVERY - ADDTL 15 MIN: Performed by: UROLOGY

## 2020-08-27 PROCEDURE — 3700000001 HC ADD 15 MINUTES (ANESTHESIA): Performed by: UROLOGY

## 2020-08-27 PROCEDURE — 74018 RADEX ABDOMEN 1 VIEW: CPT

## 2020-08-27 PROCEDURE — 2720000010 HC SURG SUPPLY STERILE: Performed by: UROLOGY

## 2020-08-27 PROCEDURE — 2709999900 HC NON-CHARGEABLE SUPPLY: Performed by: UROLOGY

## 2020-08-27 PROCEDURE — 2580000003 HC RX 258: Performed by: UROLOGY

## 2020-08-27 PROCEDURE — 3600000004 HC SURGERY LEVEL 4 BASE: Performed by: UROLOGY

## 2020-08-27 PROCEDURE — 6360000002 HC RX W HCPCS: Performed by: UROLOGY

## 2020-08-27 PROCEDURE — C1758 CATHETER, URETERAL: HCPCS | Performed by: UROLOGY

## 2020-08-27 PROCEDURE — C2617 STENT, NON-COR, TEM W/O DEL: HCPCS | Performed by: UROLOGY

## 2020-08-27 PROCEDURE — 7100000001 HC PACU RECOVERY - ADDTL 15 MIN: Performed by: UROLOGY

## 2020-08-27 PROCEDURE — C1769 GUIDE WIRE: HCPCS | Performed by: UROLOGY

## 2020-08-27 DEVICE — URETERAL STENT
Type: IMPLANTABLE DEVICE | Site: URETER | Status: FUNCTIONAL
Brand: PERCUFLEX™ PLUS

## 2020-08-27 RX ORDER — PROPOFOL 10 MG/ML
INJECTION, EMULSION INTRAVENOUS PRN
Status: DISCONTINUED | OUTPATIENT
Start: 2020-08-27 | End: 2020-08-27 | Stop reason: SDUPTHER

## 2020-08-27 RX ORDER — FENTANYL CITRATE 50 UG/ML
INJECTION, SOLUTION INTRAMUSCULAR; INTRAVENOUS PRN
Status: DISCONTINUED | OUTPATIENT
Start: 2020-08-27 | End: 2020-08-27 | Stop reason: SDUPTHER

## 2020-08-27 RX ORDER — SODIUM CHLORIDE, SODIUM LACTATE, POTASSIUM CHLORIDE, CALCIUM CHLORIDE 600; 310; 30; 20 MG/100ML; MG/100ML; MG/100ML; MG/100ML
INJECTION, SOLUTION INTRAVENOUS CONTINUOUS
Status: DISCONTINUED | OUTPATIENT
Start: 2020-08-27 | End: 2020-08-27 | Stop reason: HOSPADM

## 2020-08-27 RX ORDER — LIDOCAINE HYDROCHLORIDE 20 MG/ML
INJECTION, SOLUTION EPIDURAL; INFILTRATION; INTRACAUDAL; PERINEURAL PRN
Status: DISCONTINUED | OUTPATIENT
Start: 2020-08-27 | End: 2020-08-27 | Stop reason: SDUPTHER

## 2020-08-27 RX ORDER — DIMENHYDRINATE 50 MG/1
50 TABLET ORAL ONCE
Status: COMPLETED | OUTPATIENT
Start: 2020-08-27 | End: 2020-08-27

## 2020-08-27 RX ORDER — SODIUM CHLORIDE 0.9 % (FLUSH) 0.9 %
10 SYRINGE (ML) INJECTION PRN
Status: DISCONTINUED | OUTPATIENT
Start: 2020-08-27 | End: 2020-08-27 | Stop reason: HOSPADM

## 2020-08-27 RX ORDER — SODIUM CHLORIDE 0.9 % (FLUSH) 0.9 %
10 SYRINGE (ML) INJECTION EVERY 12 HOURS SCHEDULED
Status: DISCONTINUED | OUTPATIENT
Start: 2020-08-27 | End: 2020-08-27 | Stop reason: HOSPADM

## 2020-08-27 RX ORDER — LIDOCAINE HYDROCHLORIDE 20 MG/ML
JELLY TOPICAL PRN
Status: DISCONTINUED | OUTPATIENT
Start: 2020-08-27 | End: 2020-08-27 | Stop reason: ALTCHOICE

## 2020-08-27 RX ORDER — PROPOFOL 10 MG/ML
INJECTION, EMULSION INTRAVENOUS CONTINUOUS PRN
Status: DISCONTINUED | OUTPATIENT
Start: 2020-08-27 | End: 2020-08-27 | Stop reason: SDUPTHER

## 2020-08-27 RX ORDER — ACETAMINOPHEN 325 MG/1
650 TABLET ORAL ONCE
Status: COMPLETED | OUTPATIENT
Start: 2020-08-27 | End: 2020-08-27

## 2020-08-27 RX ORDER — CIPROFLOXACIN 2 MG/ML
400 INJECTION, SOLUTION INTRAVENOUS
Status: COMPLETED | OUTPATIENT
Start: 2020-08-27 | End: 2020-08-27

## 2020-08-27 RX ADMIN — ACETAMINOPHEN 650 MG: 325 TABLET, FILM COATED ORAL at 07:25

## 2020-08-27 RX ADMIN — FENTANYL CITRATE 50 MCG: 50 INJECTION, SOLUTION INTRAMUSCULAR; INTRAVENOUS at 08:24

## 2020-08-27 RX ADMIN — LIDOCAINE HYDROCHLORIDE 60 MG: 20 INJECTION, SOLUTION EPIDURAL; INFILTRATION; INTRACAUDAL; PERINEURAL at 07:50

## 2020-08-27 RX ADMIN — SODIUM CHLORIDE, POTASSIUM CHLORIDE, SODIUM LACTATE AND CALCIUM CHLORIDE: 600; 310; 30; 20 INJECTION, SOLUTION INTRAVENOUS at 07:27

## 2020-08-27 RX ADMIN — DIMENHYDRINATE 50 MG: 50 TABLET ORAL at 07:25

## 2020-08-27 RX ADMIN — FENTANYL CITRATE 50 MCG: 50 INJECTION, SOLUTION INTRAMUSCULAR; INTRAVENOUS at 07:50

## 2020-08-27 RX ADMIN — CIPROFLOXACIN 400 MG: 2 INJECTION, SOLUTION INTRAVENOUS at 08:02

## 2020-08-27 RX ADMIN — PROPOFOL 150 MG: 10 INJECTION, EMULSION INTRAVENOUS at 07:50

## 2020-08-27 RX ADMIN — CIPROFLOXACIN 400 MG: 2 INJECTION, SOLUTION INTRAVENOUS at 07:45

## 2020-08-27 RX ADMIN — PROPOFOL 100 MCG/KG/MIN: 10 INJECTION, EMULSION INTRAVENOUS at 08:00

## 2020-08-27 ASSESSMENT — PULMONARY FUNCTION TESTS
PIF_VALUE: 17
PIF_VALUE: 14
PIF_VALUE: 17
PIF_VALUE: 15
PIF_VALUE: 15
PIF_VALUE: 4
PIF_VALUE: 17
PIF_VALUE: 17
PIF_VALUE: 15
PIF_VALUE: 15
PIF_VALUE: 17
PIF_VALUE: 18
PIF_VALUE: 0
PIF_VALUE: 15
PIF_VALUE: 17
PIF_VALUE: 7
PIF_VALUE: 0
PIF_VALUE: 2
PIF_VALUE: 17
PIF_VALUE: 15
PIF_VALUE: 15
PIF_VALUE: 16
PIF_VALUE: 7
PIF_VALUE: 15
PIF_VALUE: 16
PIF_VALUE: 15
PIF_VALUE: 17
PIF_VALUE: 17
PIF_VALUE: 16
PIF_VALUE: 17
PIF_VALUE: 0
PIF_VALUE: 11
PIF_VALUE: 0
PIF_VALUE: 17
PIF_VALUE: 15
PIF_VALUE: 0
PIF_VALUE: 1
PIF_VALUE: 4
PIF_VALUE: 2
PIF_VALUE: 0
PIF_VALUE: 15
PIF_VALUE: 21
PIF_VALUE: 17
PIF_VALUE: 15
PIF_VALUE: 17
PIF_VALUE: 15
PIF_VALUE: 16
PIF_VALUE: 17
PIF_VALUE: 17
PIF_VALUE: 3
PIF_VALUE: 10

## 2020-08-27 ASSESSMENT — LIFESTYLE VARIABLES: SMOKING_STATUS: 0

## 2020-08-27 ASSESSMENT — PAIN - FUNCTIONAL ASSESSMENT: PAIN_FUNCTIONAL_ASSESSMENT: 0-10

## 2020-08-27 ASSESSMENT — PAIN SCALES - GENERAL: PAINLEVEL_OUTOF10: 0

## 2020-08-27 NOTE — OP NOTE
[address], [place] [address]                                OPERATIVE REPORT    PATIENT NAME: Nicolas Joyner                      :        1950  MED REC NO:   [id number]                              ROOM:       0320  ACCOUNT NO:   [de-identified]                           ADMIT DATE: 2020  PROVIDER:     Fadia Turner    DATE OF PROCEDURE:  2020    SURGEON:  Dr. Fadia Turner. ASSISTANT:  None. PREOPERATIVE DIAGNOSIS:  Left ureteral calculus. POSTOPERATIVE DIAGNOSIS:  Left ureteral calculus. PROCEDURE PERFORMED:  Cystoscopy, left ureteral stent placement. ANESTHESIA:  General.    COMPLICATIONS:  None. ESTIMATED BLOOD LOSS:  Minimal.    SPECIMENS:  None. PROSTHESIS:  A 6-Japanese 24-cm JJ ureteral stent. DISPOSITION:  Stable. FINDINGS:  Left ureteral obstruction. INDICATIONS:  The patient is a 68-year-old female with CT-proven left  ureteral calculus. The patient did try to pass the stone on her own,  was unable to, developed leukocytosis, here now for temporizing therapy  in the form of ureteral stent. DESCRIPTION OF PROCEDURE:  The patient was taken back to the operating  room after informed consent including all risks, benefits, and  alternatives were obtained. The patient was transferred from the [facility]  onto the operative table, where she was induced under general  anesthesia. She was given IV Ancef for preoperative antibiotic  prophylaxis. To begin the case, she was prepped and draped in normal  sterile fashion, placed in dorsal lithotomy. She had a 22-Japanese sheath  with a 30-degree lens passed through the urethra into the bladder. Once  in the bladder, we identified the left ureteral orifice. A 0.035-inch  wire was passed up into the kidney, confirmed via fluoroscopy. We were  then able to place a 6-Japanese x 24-cm JJ ureteral stent over the  Glidewire up into the kidney. Glidewire was removed. Proximal curl was  confirmed by fluoroscopy. Distal curl was confirmed by visualization. At this point in time, the bladder was drained. She was awoken from  general anesthesia, transferred to the [facility], and taken to the PACU in  satisfactory condition by Nursing and Anesthesia teams. PLAN:  The patient will be discharged home per PACU criteria and she  will remain under the care of the hospitalist.  We will follow her up in  one to two weeks for definitive stone therapy.         Cydney Blandon    D: 08/26/2020 13:15:59       T: 08/26/2020 20:43:47     [email or web address]  Job#: [id number]     Doc#: [id number]    CC:

## 2020-08-27 NOTE — INTERVAL H&P NOTE
Definitive tx on the left    History and Physical reviewed  I have examined the patient and no changes    Shirley Marcelino MD

## 2020-08-27 NOTE — ANESTHESIA PRE PROCEDURE
Department of Anesthesiology  Preprocedure Note       Name:  Reena Romero   Age:  68 y.o.  :  1950                                          MRN:  [id number]         Date:  2020      Surgeon: Mauro Potter):  Flakita Tavarez MD    Procedure: Procedure(s):  CYSTOSCOPY  STENT INSERTION    Medications prior to admission:   Prior to Admission medications    Medication Sig Start Date End Date Taking? Authorizing Provider   cephALEXin (KEFLEX) 500 MG capsule Take 1 capsule by mouth 3 times daily for 10 days 20  Stephanie Miller MD   potassium chloride (KLOR-CON M) 20 MEQ extended release tablet Take 1 tablet by mouth daily 20   Stephanie Miller MD   predniSONE (DELTASONE) 10 MG tablet Take 10 mg by mouth nightly     Historical Provider, MD   metoprolol tartrate (LOPRESSOR) 25 MG tablet Take 25 mg by mouth daily    Historical Provider, MD   ondansetron (ZOFRAN ODT) 4 MG disintegrating tablet Take 1 tablet by mouth every 8 hours as needed for Nausea 20   Charlie Cottrell MD   [facility]) 0.4 MG capsule Take 1 capsule by mouth daily for 5 doses 20  Charlie Cottrell MD   NONFORMULARY Med Name: [id number] Provider, MD   NONFORMULARY Med Name: Nicole Davon    Historical Provider, MD   Coenzyme Q10 (COQ10) 100 MG CAPS Take 100 mg by mouth daily    Historical Provider, MD   prednisoLONE acetate (PRED FORTE) 1 % ophthalmic suspension  18   Historical Provider, MD   atorvastatin (LIPITOR) 20 MG tablet Take 20 mg by mouth daily    Historical Provider, MD   vitamin E 400 UNIT capsule Take 400 Units by mouth daily. Historical Provider, MD   Cholecalciferol (VITAMIN D3) 2000 UNITS CAPS Take  by mouth. Historical Provider, MD   Omega-3 Fatty Acids (OMEGA 3 PO) Take  by mouth. Historical Provider, MD   Biotin 5000 MCG CAPS Take  by mouth.     Historical Provider, MD   omeprazole (PRILOSEC) 10 MG capsule Take 10 mg by mouth every other day Historical Provider, MD   therapeutic multivitamin-minerals (THERAGRAN-M) tablet Take 1 tablet by mouth daily. Historical Provider, MD   Calcium-Vitamin D (CALTRATE 600 PLUS-VIT D PO) Take 600 mg by mouth daily. Historical Provider, MD       Current medications:    No current facility-administered medications for this visit. No current outpatient medications on file. Facility-Administered Medications Ordered in Other Visits   Medication Dose Route Frequency Provider Last Rate Last Dose    lactated ringers infusion   Intravenous Continuous Sofiya Saenz MD        dimenhyDRINATE (DRAMAMINE) tablet 50 mg  50 mg Oral Once Sofiya Saenz MD        acetaminophen (TYLENOL) tablet 650 mg  650 mg Oral Once Sofiya Saenz MD        lactated ringers infusion   Intravenous Continuous Sofiya Saenz MD        sodium chloride flush 0.9 % injection 10 mL  10 mL Intravenous 2 times per day Sofiya Saenz MD        sodium chloride flush 0.9 % injection 10 mL  10 mL Intravenous PRN Sofiya Saenz MD        ciprofloxacin (CIPRO) IVPB 400 mg  400 mg Intravenous On Call to Bautista Vazquez MD           Allergies:     Allergies   Allergen Reactions    Other Itching     Wool    Adhesive Tape Rash       Problem List:    Patient Active Problem List   Diagnosis Code    Renal calculus N20.0    Ureteral calculus N20.1    UTI (urinary tract infection) N39.0    Hyponatremia E87.1    Hypokalemia E87.6    Type 2 diabetes mellitus without complication (HCC) F50.7       Past Medical History:        Diagnosis Date    Acid reflux     Arthritis     Diabetes mellitus ([address]     High cholesterol     High triglycerides     Hypertension     Inflammatory polyarthritis ([address]     Kidney stone        Past Surgical History:        Procedure Laterality Date    CATARACT REMOVAL Right 08/20/2018    with Dr. Dona Tapia COLONOSCOPY  2009    COLONOSCOPY  08/07/2020    [place]    CYSTOSCOPY INSERTION / REMOVAL STENT / STONE Left 8/21/2020    CYSTOSCOPY  STENT INSERTION performed by Kaia Berry MD at [id number]      Vaginal    HYSTEROSCOPY      NC XCAPSL [place] RM[facility] INSJ IO LENS PROSTH W/O ECP Left 7/30/2018    EYE CATARACT EMULSIFICATION IOL IMPLANT performed by Cristobal Cohn DO at [facility] W/O ECP Right 8/20/2018    EYE CATARACT EMULSIFICATION IOL IMPLANT performed by Cristobal Cohn DO at [facility]         Social History:    Social History     Tobacco Use    Smoking status: Former Smoker     Types: Cigarettes    Smokeless tobacco: Never Used   Substance Use Topics    Alcohol use: Yes     Comment: occ                                Counseling given: Not Answered      Vital Signs (Current): There were no vitals filed for this visit.                                            BP Readings from Last 3 Encounters:   08/27/20 (!) 170/88   08/22/20 (!) 153/71   08/21/20 (!) 170/62       NPO Status:                                                                                 BMI:   Wt Readings from Last 3 Encounters:   08/27/20 201 lb (91.2 kg)   08/22/20 213 lb 13.5 oz (97 kg)   08/19/20 208 lb (94.3 kg)     There is no height or weight on file to calculate BMI.    CBC:   Lab Results   Component Value Date    WBC 9.5 08/22/2020    RBC 3.47 08/22/2020    HGB 10.9 08/22/2020    HCT 33.6 08/22/2020    MCV 96.8 08/22/2020    RDW 13.4 08/22/2020     08/22/2020       CMP:   Lab Results   Component Value Date     08/22/2020    K 3.5 08/22/2020     08/22/2020    CO2 25 08/22/2020    BUN 10 08/22/2020    CREATININE 0.54 08/22/2020    GFRAA >60 08/22/2020    LABGLOM >60 08/22/2020    GLUCOSE 126 08/22/2020    PROT 6.0 08/21/2020    CALCIUM 8.6 08/22/2020    BILITOT 1.37 08/21/2020    ALKPHOS 62 08/21/2020    AST 23 08/21/2020    ALT 29 08/21/2020       POC Tests: No results for input(s):

## 2020-08-27 NOTE — PROGRESS NOTES
Discharge instructions given to pt and . Discharge Criteria    Inpatients must meet Criteria 1 through 7. All other patients are either YES or N/A. If a NO is chosen then Anesthesia or Surgeon must be notified. 1.  Minimum 30 minutes after last dose of sedative medication, minimum 120 minutes after last dose of reversal agent. Yes      2. Systolic BP stable within 20 mmHg for 30 minutes & systolic BP between 90 & 898 or within 10 mmHg of baseline. Yes      3. Pulse between 60 and 100 or within 10 bpm of baseline. Yes      4. Spontaneous respiratory rate >/= 10 per minute. Yes      5. SaO2 >/= 95 or  >/= baseline. Yes      6. Able to cough and swallow or return to baseline function. Yes      7. Alert and oriented or return to baseline mental status. Yes      8. Demonstrates controlled, coordinated movements, ambulates with steady gait, or return to baseline activity function. Yes      9. Minimal or no pain or nausea, or at a level tolerable and acceptable to patient. Yes      10. Takes and retains oral fluids as allowed. Yes      11. Procedural / perioperative site stable. Minimal or no bleeding. Yes          12. If GI endoscopy procedure, minimal or no abdominal distention or passing flatus. N/A      13. Written discharge instructions and emergency telephone number provided. Yes      14. Accompanied by a responsible adult.     Yes

## 2020-08-27 NOTE — OP NOTE
up.  We then removed the stent. Dual lumen catheter was used  to place additional Glidewire up. We then placed a flexible  ureteroscope over the Glidewire up into the mid ureter. We identified  several stones. At this point in time, we used a 200-micron laser fiber  and we ablated these stone. Several of these fragments did come out of  the distal ureter into the bladder. Once this was stone free, we then  moved up into the kidney, where we identified 5-mm stone in the lower  pole of the kidney, which we were able to ablate with a 200-micron laser  fiber. When this was done, we removed the scope leaving the Glidewire  in place. We then replaced the cystoscope over the Glidewire and we  placed 6-[place] x 24-cm double-J ureteral stent over the Glidewire up  into the kidney. Glidewire was removed. Proximal curl was confirmed by  fluoroscopy and distal curl was confirmed by visualization. The bladder  was drained. The stent string was attached to the right thigh with  Steris and benzoin. At this point in time, the patient was then awoken  from general anesthesia, transferred to the [facility], and taken to the  PACU in satisfactory condition by Nursing and Anesthesia Teams. PLAN:  The patient will be discharged home per PACU criterion and  followup with us tomorrow for stent removal via string.         Nikita Hinkle    D: 08/27/2020 10:24:32       T: 08/27/2020 10:32:40     [email or web address]  Job#: [id number]     Doc#: [id number]    CC:

## 2020-08-27 NOTE — BRIEF OP NOTE
Brief Postoperative Note      Patient: Pamela Inman  YOB: 1950  MRN: [id number]    Date of Procedure: 8/27/2020    Pre-Op Diagnosis: LEFT URETERAL CALCULUS    Post-Op Diagnosis: Same       Procedure(s):  CYSTOSCOPY URETEROSCOPY LASER-HLL  CYSTOSCOPY  STENT INSERTION-EXCHANGE    Surgeon(s):  Khris Hale MD    Assistant:  * No surgical staff found *    Anesthesia: General    Estimated Blood Loss (mL): Minimal    Complications: None    Specimens:   * No specimens in log *    Implants:  Implant Name Type Inv.  Item Serial No.  Lot No. LRB No. Used Action   STENT URET HYDRPL COAT W/O [id number] Stent:Urological STENT URET HYDRPL COAT W/O [id number]  [employer] INTERVENTIONAL [employer]IO [id number] Left 1 Implanted         Drains: * No LDAs found *    Findings: left ureteral calculus/ left renal calculus    Electronically signed by Khris Hale MD on 8/27/2020 at 8:33 AM

## 2020-08-27 NOTE — PROGRESS NOTES
0922 up to bathroom on arrival to phase 2. 0930 and 0940 checked on pt in bathroom. States she is fine. Able to void.

## 2020-08-27 NOTE — ANESTHESIA POSTPROCEDURE EVALUATION
Department of Anesthesiology  Postprocedure Note    Patient: Juan C Peguero  MRN: [id number]  YOB: 1950  Date of evaluation: 8/27/2020  Time:  11:24 AM     Procedure Summary     Date:  08/27/20 Room / Location:  [facility]    Anesthesia Start:  3327 Anesthesia Stop:  0848    Procedures:       CYSTOSCOPY URETEROSCOPY LASER-HLL (Left )      CYSTOSCOPY  STENT INSERTION-EXCHANGE (Left ) Diagnosis:  (LEFT URETERAL CALCULUS)    Surgeon:  Mik Silva MD Responsible Provider:  TERRENCE Engle CRNA    Anesthesia Type:  MAC ASA Status:  3          Anesthesia Type: MAC    Aguila Phase I: Aguila Score: 10    Aguila Phase II: Aguila Score: 10    Last vitals: Reviewed and per EMR flowsheets.        Anesthesia Post Evaluation    Patient location during evaluation: PACU  Patient participation: complete - patient participated  Level of consciousness: awake and alert  Pain score: 0  Airway patency: patent  Nausea & Vomiting: no nausea and no vomiting  Complications: no  Cardiovascular status: blood pressure returned to baseline  Respiratory status: acceptable  Hydration status: euvolemic

## 2020-08-28 ENCOUNTER — OFFICE VISIT (OUTPATIENT)
Dept: UROLOGY | Age: 70
End: 2020-08-28
Payer: MEDICARE

## 2020-08-28 VITALS
SYSTOLIC BLOOD PRESSURE: 144 MMHG | BODY MASS INDEX: 34.15 KG/M2 | TEMPERATURE: 96.6 F | DIASTOLIC BLOOD PRESSURE: 84 MMHG | HEIGHT: 64 IN | WEIGHT: 200 LBS

## 2020-08-28 PROCEDURE — 1036F TOBACCO NON-USER: CPT | Performed by: UROLOGY

## 2020-08-28 PROCEDURE — 1090F PRES/ABSN URINE INCON ASSESS: CPT | Performed by: UROLOGY

## 2020-08-28 PROCEDURE — 3017F COLORECTAL CA SCREEN DOC REV: CPT | Performed by: UROLOGY

## 2020-08-28 PROCEDURE — 1111F DSCHRG MED/CURRENT MED MERGE: CPT | Performed by: UROLOGY

## 2020-08-28 PROCEDURE — 99213 OFFICE O/P EST LOW 20 MIN: CPT | Performed by: UROLOGY

## 2020-08-28 PROCEDURE — G8427 DOCREV CUR MEDS BY ELIG CLIN: HCPCS | Performed by: UROLOGY

## 2020-08-28 PROCEDURE — G8417 CALC BMI ABV UP PARAM F/U: HCPCS | Performed by: UROLOGY

## 2020-08-28 PROCEDURE — 4040F PNEUMOC VAC/ADMIN/RCVD: CPT | Performed by: UROLOGY

## 2020-08-28 PROCEDURE — 1123F ACP DISCUSS/DSCN MKR DOCD: CPT | Performed by: UROLOGY

## 2020-08-28 PROCEDURE — G8399 PT W/DXA RESULTS DOCUMENT: HCPCS | Performed by: UROLOGY

## 2020-08-28 PROCEDURE — 99211 OFF/OP EST MAY X REQ PHY/QHP: CPT

## 2020-08-28 ASSESSMENT — ENCOUNTER SYMPTOMS
EYE PAIN: 0
VOMITING: 0
WHEEZING: 0
ABDOMINAL PAIN: 0
COUGH: 0
NAUSEA: 0
SHORTNESS OF BREATH: 0
BACK PAIN: 0
EYE REDNESS: 0
COLOR CHANGE: 0

## 2020-08-28 NOTE — PROGRESS NOTES
HPI:    Patient is a 68 y.o. female in no acute distress. She is alert and oriented to person, place, and time. History  Patient being seen here today as a new patient. Patient did develop left-sided flank pain that started last night. Patient did not have any gross hematuria dysuria. Patient is never had a kidney stone in the past.  Upon being admitted to the emergency department patient did have an episode of vomiting. Patient did get a CT scan upon admission. This film was independently reviewed today. This does show a 5 mm left ureteral stone. Patient does have other smaller stones in her left kidney as well. Patient has never had stones in the past.  Currently her pain is well controlled with oral pain medication. She has no signs of infection. She reports no flank pain nausea or vomiting currently. She has minimal lower urinary tract symptoms. 8/27/20 Left HLL    Currently  Patient is here today for stent pull. Patient did have a recent definitive stone therapy on her left side. Patient did tolerate the procedure well. She is doing well. We are able to move her stent without difficulty today. Patient has had no flank pain nausea vomiting. She is seen a small amount of hematuria. Otherwise she is doing very well.     Past Medical History:   Diagnosis Date    Acid reflux     Arthritis     Diabetes mellitus ([address]     High cholesterol     High triglycerides     Hypertension     Inflammatory polyarthritis ([address]     Kidney stone      Past Surgical History:   Procedure Laterality Date    CATARACT REMOVAL Right 08/20/2018    with Dr. Cindy Lee COLONOSCOPY  2009    [place]    CYSTOSCOPY Left     hll with stent    CYSTOSCOPY INSERTION / REMOVAL STENT / STONE Left 8/21/2020    CYSTOSCOPY  STENT INSERTION performed by Eron Ramirez MD at [address] [facility] [address]      Vaginal    HYSTEROSCOPY      MN XCAPSL CTRC RMVL INSJ IO LENS PROSTH W/O ECP Left capsule Take 1 capsule by mouth 3 times daily for 10 days 30 capsule 0    potassium chloride (KLOR-CON M) 20 MEQ extended release tablet Take 1 tablet by mouth daily 30 tablet 5    predniSONE (DELTASONE) 10 MG tablet Take 10 mg by mouth nightly       metoprolol tartrate (LOPRESSOR) 25 MG tablet Take 25 mg by mouth daily      ondansetron (ZOFRAN ODT) 4 MG disintegrating tablet Take 1 tablet by mouth every 8 hours as needed for Nausea 20 tablet 0    NONFORMULARY Med Name: [facility] NONFORMULARY Med Name: Zaki Castro      Coenzyme Q10 (COQ10) 100 MG CAPS Take 100 mg by mouth daily      prednisoLONE acetate (PRED FORTE) 1 % ophthalmic suspension       atorvastatin (LIPITOR) 20 MG tablet Take 20 mg by mouth daily      vitamin E 400 UNIT capsule Take 400 Units by mouth daily.  Cholecalciferol (VITAMIN D3) 2000 UNITS CAPS Take  by mouth.  Omega-3 Fatty Acids (OMEGA 3 PO) Take  by mouth.  Biotin 5000 MCG CAPS Take  by mouth.  omeprazole (PRILOSEC) 10 MG capsule Take 10 mg by mouth every other day       therapeutic multivitamin-minerals (THERAGRAN-M) tablet Take 1 tablet by mouth daily.  Calcium-Vitamin D (CALTRATE 600 PLUS-VIT D PO) Take 600 mg by mouth daily.  tamsulosin (FLOMAX) 0.4 MG capsule Take 1 capsule by mouth daily for 5 doses 5 capsule 0     No current facility-administered medications on file prior to visit. Other and Adhesive tape  Family History   Adopted: Yes   Problem Relation Age of Onset    Diabetes Sister      Social History     Tobacco Use   Smoking Status Former Smoker    Types: Cigarettes   Smokeless Tobacco Never Used       Social History     Substance and Sexual Activity   Alcohol Use Yes    Comment: occ       Review of Systems   Constitutional: Negative for appetite change, chills and fever. Eyes: Negative for pain, redness and visual disturbance. Respiratory: Negative for cough, shortness of breath and wheezing. Cardiovascular: Negative for chest pain and leg swelling. Gastrointestinal: Negative for abdominal pain, nausea and vomiting. Genitourinary: Positive for hematuria. Negative for difficulty urinating, dysuria, flank pain, frequency, pelvic pain, vaginal bleeding and vaginal discharge. Musculoskeletal: Negative for back pain, joint swelling and myalgias. Skin: Negative for color change, rash and wound. Neurological: Negative for dizziness, tremors and numbness. Hematological: Negative for adenopathy. Does not bruise/bleed easily. BP (!) 144/84 (Site: Left Upper Arm, Position: Sitting, Cuff Size: Medium Adult)   Temp 96.6 °F (35.9 °C)   Ht 5' 4\" (1.626 m)   Wt 200 lb (90.7 kg)   BMI 34.33 kg/m²       PHYSICAL EXAM:  Constitutional: Patient resting comfortably, in no acute distress. Neuro: Alert and oriented to person place and time. Cranial nerves grossly intact. Psych: Mood and affect normal.  Skin: Warm, dry  HEENT: normocephalic, atraumatic  Lymphatics: No palpable lymphadenopathy  Lungs: Respiratory effort normal, unlabored  Cardiovascular:  Normal peripheral pulses  Abdomen: Soft, non-tender, non-distended with no organomegaly or palpable masses. : No CVA tenderness. Bladder non-tender and not distended. Pelvic: deferred    Lab Results   Component Value Date    BUN 10 08/22/2020     Lab Results   Component Value Date    CREATININE 0.54 08/22/2020       ASSESSMENT:  This is a 68 y.o. female with the following diagnoses:   Diagnosis Orders   1. Renal calculus  XR ABDOMEN (KUB) (SINGLE AP VIEW)         PLAN:  Stent removed today without difficulty. She will follow-up with us in 6 weeks with a repeat KUB. We did go over stone prevention from a dietary standpoint today.

## 2020-08-28 NOTE — PROGRESS NOTES
Pt had ureteral stent placed on 08/27/2020 following left HLL. Stent removed via string in office today without difficulty. Pt tolerated removal well.

## 2020-09-20 PROBLEM — N39.0 UTI (URINARY TRACT INFECTION): Status: RESOLVED | Noted: 2020-08-21 | Resolved: 2020-09-20

## 2020-10-05 ENCOUNTER — HOSPITAL ENCOUNTER (OUTPATIENT)
Age: 70
Discharge: HOME | End: 2020-10-07
Payer: MEDICARE

## 2020-10-05 ENCOUNTER — HOSPITAL ENCOUNTER (OUTPATIENT)
Dept: GENERAL RADIOLOGY | Age: 70
Discharge: HOME | End: 2020-10-07
Payer: MEDICARE

## 2020-10-05 ENCOUNTER — OFFICE VISIT (OUTPATIENT)
Dept: UROLOGY | Age: 70
End: 2020-10-05
Payer: MEDICARE

## 2020-10-05 VITALS
SYSTOLIC BLOOD PRESSURE: 150 MMHG | BODY MASS INDEX: 35.17 KG/M2 | WEIGHT: 206 LBS | DIASTOLIC BLOOD PRESSURE: 85 MMHG | HEART RATE: 73 BPM | HEIGHT: 64 IN

## 2020-10-05 PROCEDURE — 1123F ACP DISCUSS/DSCN MKR DOCD: CPT | Performed by: UROLOGY

## 2020-10-05 PROCEDURE — 99213 OFFICE O/P EST LOW 20 MIN: CPT | Performed by: UROLOGY

## 2020-10-05 PROCEDURE — 99211 OFF/OP EST MAY X REQ PHY/QHP: CPT

## 2020-10-05 PROCEDURE — G8399 PT W/DXA RESULTS DOCUMENT: HCPCS | Performed by: UROLOGY

## 2020-10-05 PROCEDURE — 1090F PRES/ABSN URINE INCON ASSESS: CPT | Performed by: UROLOGY

## 2020-10-05 PROCEDURE — G8427 DOCREV CUR MEDS BY ELIG CLIN: HCPCS | Performed by: UROLOGY

## 2020-10-05 PROCEDURE — 4040F PNEUMOC VAC/ADMIN/RCVD: CPT | Performed by: UROLOGY

## 2020-10-05 PROCEDURE — G8417 CALC BMI ABV UP PARAM F/U: HCPCS | Performed by: UROLOGY

## 2020-10-05 PROCEDURE — 3017F COLORECTAL CA SCREEN DOC REV: CPT | Performed by: UROLOGY

## 2020-10-05 PROCEDURE — 1036F TOBACCO NON-USER: CPT | Performed by: UROLOGY

## 2020-10-05 PROCEDURE — G8484 FLU IMMUNIZE NO ADMIN: HCPCS | Performed by: UROLOGY

## 2020-10-05 PROCEDURE — 74018 RADEX ABDOMEN 1 VIEW: CPT

## 2020-10-05 ASSESSMENT — ENCOUNTER SYMPTOMS
COUGH: 0
COLOR CHANGE: 0
ABDOMINAL PAIN: 0
BACK PAIN: 0
EYE REDNESS: 0
SHORTNESS OF BREATH: 0
VOMITING: 0
NAUSEA: 0
EYE PAIN: 0
WHEEZING: 0

## 2020-10-05 NOTE — PROGRESS NOTES
HPI:    Patient is a 68 y.o. female in no acute distress. She is alert and oriented to person, place, and time. History  Patient being seen here today as a new patient. Julita Banegas did develop left-sided flank pain that started last night.  Patient did not have any gross hematuria dysuria.  Patient is never had a kidney stone in the past. Ruth Aguirre being admitted to the emergency department patient did have an episode of vomiting.  Patient did get a CT scan upon admission.  This film was independently reviewed today.  This does show a 5 mm left ureteral stone.  Patient does have other smaller stones in her left kidney as well.  Patient has never had stones in the past.  Currently her pain is well controlled with oral pain medication.  She has no signs of infection.  She reports no flank pain nausea or vomiting currently. Justice Haro has minimal lower urinary tract symptoms.     8/27/20 Left HLL    Currently  Patient is here today for 6-month follow-up. Patient did get a recent KUB. This film was independently reviewed today. This does show 2 small stones in the left kidney. Patient is having no pain. She has no gross hematuria dysuria. She has no new or worsening lower urinary tract symptoms. Patient has no flank pain nausea vomiting. No fevers. She has increased her water intake. She has worked on decreasing her sodium consumption.     Past Medical History:   Diagnosis Date    Acid reflux     Arthritis     Diabetes mellitus ([address]     High cholesterol     High triglycerides     Hypertension     Inflammatory polyarthritis ([address]     Kidney stone      Past Surgical History:   Procedure Laterality Date    CATARACT REMOVAL Right 08/20/2018    with Dr. Humberto Oscar COLONOSCOPY  2009    [place]    CYSTOSCOPY Left     hll with stent    CYSTOSCOPY Left 8/27/2020    CYSTOSCOPY URETEROSCOPY LASER-HLL performed by Tila Barclay MD at [address] / Elli Ponce / Alexandre Tai Left 8/21/2020    CYSTOSCOPY  STENT INSERTION performed by Laney Guerra MD at [address] / [address] / STONE Left 8/27/2020    CYSTOSCOPY  STENT INSERTION-EXCHANGE performed by Laney Guerra MD at [address]      Vaginal    HYSTEROSCOPY      ME XCAPSL [place] RMVL INSJ IO LENS PROSTH W/O ECP Left 7/30/2018    EYE CATARACT EMULSIFICATION IOL IMPLANT performed by Lilia Muñoz DO at [address] [facility] RMVL INSJ IO LENS PROSTH W/O ECP Right 8/20/2018    EYE CATARACT EMULSIFICATION IOL IMPLANT performed by Lilia Muñoz DO at [address] [facility] EXTRACTION       Outpatient Encounter Medications as of 10/5/2020   Medication Sig Dispense Refill    predniSONE (DELTASONE) 10 MG tablet Take 10 mg by mouth nightly       metoprolol tartrate (LOPRESSOR) 25 MG tablet Take 25 mg by mouth daily      NONFORMULARY Med Name: L[facility] [place] JOINT      NONFORMULARY Med Name: Ben García      Coenzyme Q10 (COQ10) 100 MG CAPS Take 100 mg by mouth daily      atorvastatin (LIPITOR) 20 MG tablet Take 20 mg by mouth daily      vitamin E 400 UNIT capsule Take 400 Units by mouth daily.  Cholecalciferol (VITAMIN D3) 2000 UNITS CAPS Take  by mouth.  Omega-3 Fatty Acids (OMEGA 3 PO) Take  by mouth.  Biotin 5000 MCG CAPS Take  by mouth.  omeprazole (PRILOSEC) 10 MG capsule Take 10 mg by mouth every other day       therapeutic multivitamin-minerals (THERAGRAN-M) tablet Take 1 tablet by mouth daily.  Calcium-Vitamin D (CALTRATE 600 PLUS-VIT D PO) Take 600 mg by mouth daily.         potassium chloride (KLOR-CON M) 20 MEQ extended release tablet Take 1 tablet by mouth daily (Patient not taking: Reported on 10/5/2020) 30 tablet 5    ondansetron (ZOFRAN ODT) 4 MG disintegrating tablet Take 1 tablet by mouth every 8 hours as needed for Nausea (Patient not taking: Reported on 10/5/2020) 20 tablet 0    tamsulosin (FLOMAX) 0.4 MG capsule Take 1 capsule by mouth daily for 5 doses 5 capsule 0    prednisoLONE acetate (PRED FORTE) 1 % ophthalmic suspension        No facility-administered encounter medications on file as of 10/5/2020. Current Outpatient Medications on File Prior to Visit   Medication Sig Dispense Refill    predniSONE (DELTASONE) 10 MG tablet Take 10 mg by mouth nightly       metoprolol tartrate (LOPRESSOR) 25 MG tablet Take 25 mg by mouth daily      NONFORMULARY Med Name: Lily Muñoz JOINT      NONFORMULARY Med Name: Ezio Huertas      Coenzyme Q10 (COQ10) 100 MG CAPS Take 100 mg by mouth daily      atorvastatin (LIPITOR) 20 MG tablet Take 20 mg by mouth daily      vitamin E 400 UNIT capsule Take 400 Units by mouth daily.  Cholecalciferol (VITAMIN D3) 2000 UNITS CAPS Take  by mouth.  Omega-3 Fatty Acids (OMEGA 3 PO) Take  by mouth.  Biotin 5000 MCG CAPS Take  by mouth.  omeprazole (PRILOSEC) 10 MG capsule Take 10 mg by mouth every other day       therapeutic multivitamin-minerals (THERAGRAN-M) tablet Take 1 tablet by mouth daily.  Calcium-Vitamin D (CALTRATE 600 PLUS-VIT D PO) Take 600 mg by mouth daily.  potassium chloride (KLOR-CON M) 20 MEQ extended release tablet Take 1 tablet by mouth daily (Patient not taking: Reported on 10/5/2020) 30 tablet 5    ondansetron (ZOFRAN ODT) 4 MG disintegrating tablet Take 1 tablet by mouth every 8 hours as needed for Nausea (Patient not taking: Reported on 10/5/2020) 20 tablet 0    tamsulosin (FLOMAX) 0.4 MG capsule Take 1 capsule by mouth daily for 5 doses 5 capsule 0    prednisoLONE acetate (PRED FORTE) 1 % ophthalmic suspension        No current facility-administered medications on file prior to visit.       Other and Adhesive tape  Family History   Adopted: Yes   Problem Relation Age of Onset    Diabetes Sister      Social History     Tobacco Use   Smoking Status Former Smoker    Types: Cigarettes   Smokeless Tobacco in 1 year. She will work on her dietary reduction of stone. We did go over dietary prevention from stones today.

## 2021-07-12 ENCOUNTER — HOSPITAL ENCOUNTER (OUTPATIENT)
Dept: WOMENS IMAGING | Age: 71
Discharge: HOME | End: 2021-07-14
Payer: MEDICARE

## 2021-07-12 DIAGNOSIS — Z12.31 BREAST CANCER SCREENING BY MAMMOGRAM: ICD-10-CM

## 2021-07-12 PROCEDURE — 77063 BREAST TOMOSYNTHESIS BI: CPT

## 2021-07-16 ENCOUNTER — HOSPITAL ENCOUNTER (OUTPATIENT)
Dept: ULTRASOUND IMAGING | Age: 71
Discharge: HOME | End: 2021-07-18
Payer: MEDICARE

## 2021-07-16 ENCOUNTER — HOSPITAL ENCOUNTER (OUTPATIENT)
Dept: WOMENS IMAGING | Age: 71
Discharge: HOME | End: 2021-07-18
Payer: MEDICARE

## 2021-07-16 DIAGNOSIS — R92.8 ABNORMAL MAMMOGRAM OF RIGHT BREAST: ICD-10-CM

## 2021-07-16 PROCEDURE — 77065 DX MAMMO INCL CAD UNI: CPT

## 2021-07-16 PROCEDURE — 76641 ULTRASOUND BREAST COMPLETE: CPT

## 2021-07-16 PROCEDURE — 76642 ULTRASOUND BREAST LIMITED: CPT

## 2021-07-16 PROCEDURE — G0279 TOMOSYNTHESIS, MAMMO: HCPCS

## 2021-09-23 ENCOUNTER — HOSPITAL ENCOUNTER (OUTPATIENT)
Age: 71
Discharge: HOME | End: 2021-09-25
Payer: MEDICARE

## 2021-09-23 ENCOUNTER — HOSPITAL ENCOUNTER (OUTPATIENT)
Dept: GENERAL RADIOLOGY | Age: 71
Discharge: HOME | End: 2021-09-25
Payer: MEDICARE

## 2021-09-23 DIAGNOSIS — N20.1 URETERAL CALCULUS: ICD-10-CM

## 2021-09-23 DIAGNOSIS — N20.0 RENAL CALCULUS: ICD-10-CM

## 2021-09-23 PROCEDURE — 74018 RADEX ABDOMEN 1 VIEW: CPT

## 2021-09-27 ENCOUNTER — OFFICE VISIT (OUTPATIENT)
Dept: UROLOGY | Age: 71
End: 2021-09-27
Payer: MEDICARE

## 2021-09-27 VITALS — WEIGHT: 184 LBS | TEMPERATURE: 97.3 F | HEIGHT: 64 IN | BODY MASS INDEX: 31.41 KG/M2

## 2021-09-27 DIAGNOSIS — N20.0 RENAL CALCULUS: Primary | ICD-10-CM

## 2021-09-27 PROCEDURE — 1090F PRES/ABSN URINE INCON ASSESS: CPT | Performed by: NURSE PRACTITIONER

## 2021-09-27 PROCEDURE — 1123F ACP DISCUSS/DSCN MKR DOCD: CPT | Performed by: NURSE PRACTITIONER

## 2021-09-27 PROCEDURE — G8417 CALC BMI ABV UP PARAM F/U: HCPCS | Performed by: NURSE PRACTITIONER

## 2021-09-27 PROCEDURE — 1036F TOBACCO NON-USER: CPT | Performed by: NURSE PRACTITIONER

## 2021-09-27 PROCEDURE — 99211 OFF/OP EST MAY X REQ PHY/QHP: CPT

## 2021-09-27 PROCEDURE — G8399 PT W/DXA RESULTS DOCUMENT: HCPCS | Performed by: NURSE PRACTITIONER

## 2021-09-27 PROCEDURE — 4040F PNEUMOC VAC/ADMIN/RCVD: CPT | Performed by: NURSE PRACTITIONER

## 2021-09-27 PROCEDURE — G8427 DOCREV CUR MEDS BY ELIG CLIN: HCPCS | Performed by: NURSE PRACTITIONER

## 2021-09-27 PROCEDURE — 99213 OFFICE O/P EST LOW 20 MIN: CPT | Performed by: NURSE PRACTITIONER

## 2021-09-27 PROCEDURE — 3017F COLORECTAL CA SCREEN DOC REV: CPT | Performed by: NURSE PRACTITIONER

## 2021-09-27 RX ORDER — HYDROCHLOROTHIAZIDE 12.5 MG/1
12.5 TABLET ORAL EVERY MORNING
COMMUNITY
Start: 2021-03-08

## 2021-09-27 RX ORDER — IBUPROFEN 200 MG
200 TABLET ORAL EVERY 6 HOURS PRN
COMMUNITY

## 2021-09-27 ASSESSMENT — ENCOUNTER SYMPTOMS
COUGH: 0
NAUSEA: 0
COLOR CHANGE: 0
ABDOMINAL PAIN: 0
VOMITING: 0
CONSTIPATION: 0
APNEA: 0
EYE REDNESS: 0
BACK PAIN: 0
SHORTNESS OF BREATH: 0
WHEEZING: 0

## 2021-09-27 NOTE — PROGRESS NOTES
HPI:        Patient is a 65 y.o. female in no acute distress. She is alert and oriented to person, place, and time. History  8/2021 Left HLL    Today  Here today for annual stone follow-up. She denies any flank or abdominal pain, dysuria or gross hematuria. She denies any episodes of spontaneous stone passage. She did have a KUB completed prior to today's visit. This film was independently reviewed and shows a stable left renal stone. This is unchanged from prior.     Past Medical History:   Diagnosis Date    Acid reflux     Arthritis     Diabetes mellitus ([address]     High cholesterol     High triglycerides     Hypertension     [address] [address]     Kidney stone      Past Surgical History:   Procedure Laterality Date    CATARACT REMOVAL Right 08/20/2018    with Dr. Felipe Harris  2009    [place]    CYSTOSCOPY Left     hll with stent    CYSTOSCOPY Left 8/27/2020    CYSTOSCOPY URETEROSCOPY LASER-HLL performed by Ruben Martini MD at [address] [place] [address] / [facility] / Christian Camilo Left 8/21/2020    CYSTOSCOPY  STENT INSERTION performed by Ruben Martini MD at [address] [facility] [address] / [facility] / STONE Left 8/27/2020    CYSTOSCOPY  STENT INSERTION-EXCHANGE performed by Ruben Martini MD at [address]      Vaginal    HYSTEROSCOPY      MN XCAPSL CTRC RMVL INSJ IO LENS PROSTH W/O ECP Left 7/30/2018    EYE CATARACT EMULSIFICATION IOL IMPLANT performed by Aida Morgan DO at [address] [facility] W/O ECP Right 8/20/2018    EYE CATARACT EMULSIFICATION IOL IMPLANT performed by Aida Morgan DO at [address] [facility] EXTRACTION       Outpatient Encounter Medications as of 9/27/2021   Medication Sig Dispense Refill    hydroCHLOROthiazide (HYDRODIURIL) 12.5 MG tablet Take 12.5 mg by mouth every morning      metFORMIN (GLUCOPHAGE) 1000 MG tablet TAKE every morning      metFORMIN (GLUCOPHAGE) 1000 MG tablet TAKE 1 TABLET BY MOUTH TWICE A DAY      ibuprofen (ADVIL;MOTRIN) 200 MG tablet Take 200 mg by mouth every 6 hours as needed for Pain      potassium chloride (KLOR-CON M) 20 MEQ extended release tablet Take 1 tablet by mouth daily (Patient taking differently: Take 10 mEq by mouth daily ) 30 tablet 5    predniSONE (DELTASONE) 10 MG tablet Take 10 mg by mouth nightly       metoprolol tartrate (LOPRESSOR) 25 MG tablet Take 25 mg by mouth daily      NONFORMULARY Med Name: REPLENEX EXTRA JOINT      Coenzyme Q10 (COQ10) 100 MG CAPS Take 100 mg by mouth daily      atorvastatin (LIPITOR) 20 MG tablet Take 20 mg by mouth daily      vitamin E 400 UNIT capsule Take 400 Units by mouth daily.  Cholecalciferol (VITAMIN D3) 2000 UNITS CAPS Take  by mouth.  Omega-3 Fatty Acids (OMEGA 3 PO) Take  by mouth.  Biotin 5000 MCG CAPS Take  by mouth.  therapeutic multivitamin-minerals (THERAGRAN-M) tablet Take 1 tablet by mouth daily.  Calcium-Vitamin D (CALTRATE 600 PLUS-VIT D PO) Take 600 mg by mouth daily. No current facility-administered medications on file prior to visit. Other, [place] (diagnostic), and Adhesive tape  Family History   Adopted: Yes   Problem Relation Age of Onset    Diabetes Sister      Social History     Tobacco Use   Smoking Status Former Smoker    Types: Cigarettes   Smokeless Tobacco Never Used       Social History     Substance and Sexual Activity   Alcohol Use Yes    Comment: occ       Review of Systems   Constitutional: Negative for appetite change, chills and fever. Eyes: Negative for redness and visual disturbance. Respiratory: Negative for apnea, cough, shortness of breath and wheezing. Cardiovascular: Negative for chest pain and leg swelling. Gastrointestinal: Negative for abdominal pain, constipation, nausea and vomiting.    Genitourinary: Negative for difficulty urinating, dyspareunia, dysuria, enuresis, flank pain, frequency, hematuria, pelvic pain, urgency, vaginal bleeding and vaginal discharge. Musculoskeletal: Negative for back pain, joint swelling and myalgias. Skin: Negative for color change, rash and wound. Neurological: Negative for dizziness, tremors and numbness. Hematological: Negative for adenopathy. Does not bruise/bleed easily. Psychiatric/Behavioral: Negative for sleep disturbance. Temp 97.3 °F (36.3 °C)   Ht 5' 4\" (1.626 m)   Wt 184 lb (83.5 kg)   BMI 31.58 kg/m²       PHYSICAL EXAM:  Constitutional: Patient resting comfortably, in no acute distress. Neuro: Alert and oriented to person place and time. Cranial nerves grossly intact. Psych: Mood and affect normal.  Skin: Warm, dry  HEENT: normocephalic, atraumatic  Lymphatics: No palpable lymphadenopathy  Lungs: Respiratory effort normal, unlabored  Cardiovascular:  Normal peripheral pulses  Abdomen: Soft, non-tender, non-distended with no organomegaly or palpable masses. : No CVA tenderness. Bladder non-tender and not distended. Pelvic: Deferred    Lab Results   Component Value Date    BUN 10 08/22/2020     Lab Results   Component Value Date    CREATININE 0.54 08/22/2020       ASSESSMENT:   Diagnosis Orders   1. Renal calculus  XR ABDOMEN (KUB) (SINGLE AP VIEW)           PLAN:  Offered ESWL versus observation. Patient would like to continue to proceed with observation    To prevent future stone formation:  1) drink around 80 ounces of water per day  2) Avoid/minimize intake of \"bad fluids\" (soda pop, coffee, tea, alcohol, energy drinks)  3) reduce consumption of sodium/salt  4) reduce consumption of fatty animal protein (full-fat cheese/milk/dairy, red meats, pork). Limit protein intake to low-fat/lean options (low-fat dairy, fish, chicken, turkey)    We will plan to see her in 1 year with a KUB prior or sooner if needed for new or worsening symptoms.

## 2022-07-18 ENCOUNTER — HOSPITAL ENCOUNTER (OUTPATIENT)
Dept: WOMENS IMAGING | Age: 72
Discharge: HOME | End: 2022-07-20
Payer: MEDICARE

## 2022-07-18 ENCOUNTER — HOSPITAL ENCOUNTER (OUTPATIENT)
Dept: ULTRASOUND IMAGING | Age: 72
Discharge: HOME | End: 2022-07-20
Payer: MEDICARE

## 2022-07-18 DIAGNOSIS — R92.8 ABNORMAL MAMMOGRAM: ICD-10-CM

## 2022-07-18 PROCEDURE — 76642 ULTRASOUND BREAST LIMITED: CPT

## 2022-07-18 PROCEDURE — G0279 TOMOSYNTHESIS, MAMMO: HCPCS

## 2022-09-22 ENCOUNTER — HOSPITAL ENCOUNTER (OUTPATIENT)
Dept: GENERAL RADIOLOGY | Age: 72
Discharge: HOME | End: 2022-09-24
Payer: MEDICARE

## 2022-09-22 ENCOUNTER — HOSPITAL ENCOUNTER (OUTPATIENT)
Age: 72
Discharge: HOME | End: 2022-09-24
Payer: MEDICARE

## 2022-09-22 DIAGNOSIS — N20.0 RENAL CALCULUS: ICD-10-CM

## 2022-09-22 PROCEDURE — 74018 RADEX ABDOMEN 1 VIEW: CPT

## 2022-09-26 ENCOUNTER — OFFICE VISIT (OUTPATIENT)
Dept: UROLOGY | Age: 72
End: 2022-09-26
Payer: MEDICARE

## 2022-09-26 VITALS
BODY MASS INDEX: 31.92 KG/M2 | SYSTOLIC BLOOD PRESSURE: 126 MMHG | HEIGHT: 64 IN | HEART RATE: 77 BPM | DIASTOLIC BLOOD PRESSURE: 72 MMHG | WEIGHT: 187 LBS

## 2022-09-26 DIAGNOSIS — N20.0 RENAL CALCULUS: Primary | ICD-10-CM

## 2022-09-26 DIAGNOSIS — N20.1 URETERAL CALCULUS: ICD-10-CM

## 2022-09-26 PROCEDURE — G8399 PT W/DXA RESULTS DOCUMENT: HCPCS | Performed by: UROLOGY

## 2022-09-26 PROCEDURE — G8417 CALC BMI ABV UP PARAM F/U: HCPCS | Performed by: UROLOGY

## 2022-09-26 PROCEDURE — 1123F ACP DISCUSS/DSCN MKR DOCD: CPT | Performed by: UROLOGY

## 2022-09-26 PROCEDURE — 1090F PRES/ABSN URINE INCON ASSESS: CPT | Performed by: UROLOGY

## 2022-09-26 PROCEDURE — 3017F COLORECTAL CA SCREEN DOC REV: CPT | Performed by: UROLOGY

## 2022-09-26 PROCEDURE — 1036F TOBACCO NON-USER: CPT | Performed by: UROLOGY

## 2022-09-26 PROCEDURE — G8427 DOCREV CUR MEDS BY ELIG CLIN: HCPCS | Performed by: UROLOGY

## 2022-09-26 PROCEDURE — 99214 OFFICE O/P EST MOD 30 MIN: CPT | Performed by: UROLOGY

## 2022-09-26 PROCEDURE — 99211 OFF/OP EST MAY X REQ PHY/QHP: CPT

## 2022-09-26 RX ORDER — BLOOD SUGAR DIAGNOSTIC
STRIP MISCELLANEOUS
COMMUNITY
Start: 2022-08-22

## 2022-09-26 ASSESSMENT — ENCOUNTER SYMPTOMS
VOMITING: 0
BACK PAIN: 0
NAUSEA: 0
COLOR CHANGE: 0
SHORTNESS OF BREATH: 0
CONSTIPATION: 0
WHEEZING: 0
ABDOMINAL PAIN: 0
COUGH: 0
EYE REDNESS: 0
APNEA: 0

## 2022-09-26 NOTE — PROGRESS NOTES
HPI:        Patient is a 66 y.o. female in no acute distress. She is alert and oriented to person, place, and time. History  8/2021 Left HLL       Currently  Patient is here today for 1 year follow-up. Patient is doing well. No recent gross hematuria dysuria. Patient did get a recent KUB. This film was intimately reviewed today. This does show multiple stones in the left kidney. They do appear to be somewhat larger than last year. Patient has no flank pain nausea or vomiting. She is not any spontaneous stone passage. She reports no gross hematuria or dysuria.     Past Medical History:   Diagnosis Date    Acid reflux     Arthritis     Diabetes mellitus (HCC)     High cholesterol     High triglycerides     Hypertension     Inflammatory polyarthritis ([address]     Kidney stone      Past Surgical History:   Procedure Laterality Date    CATARACT REMOVAL Right 08/20/2018    with Dr. Rowena Summers    COLONOSCOPY  2009    COLONOSCOPY  08/07/2020    [place]    CYSTOSCOPY Left     hll with stent    CYSTOSCOPY Left 8/27/2020    CYSTOSCOPY URETEROSCOPY LASER-HLL performed by Camille Marks MD at [place] [address]  / [facility] / Melissa Pina Left 8/21/2020    CYSTOSCOPY  STENT INSERTION performed by Camille Marks MD at [place] [address] / [facility] / Melissa Pina Left 8/27/2020    CYSTOSCOPY  STENT [address] performed by Camille Marks MD at [facility]      Vaginal    HYSTEROSCOPY      TX XCAPSL CTRC RMVL INSJ IO LENS PROSTH W/O ECP Left 7/30/2018    EYE CATARACT EMULSIFICATION IOL IMPLANT performed by Vandana Farris DO at [address] W/O ECP Right 8/20/2018    EYE CATARACT EMULSIFICATION IOL IMPLANT performed by Vandana Farris DO at [address] [facility]       Outpatient Encounter Medications as of 9/26/2022   Medication Sig Dispense Refill    hydroCHLOROthiazide (HYDRODIURIL) 12.5 MG tablet Take 12.5 mg by mouth every morning      metFORMIN (GLUCOPHAGE) 1000 MG tablet TAKE 1 TABLET BY MOUTH TWICE A DAY      ibuprofen (ADVIL;MOTRIN) 200 MG tablet Take 200 mg by mouth every 6 hours as needed for Pain      potassium chloride (KLOR-CON M) 20 MEQ extended release tablet Take 1 tablet by mouth daily (Patient taking differently: Take 10 mEq by mouth daily) 30 tablet 5    predniSONE (DELTASONE) 10 MG tablet Take 10 mg by mouth nightly       metoprolol tartrate (LOPRESSOR) 25 MG tablet Take 25 mg by mouth daily      NONFORMULARY Med Name: REPLENEX EXTRA JOINT      Coenzyme Q10 (COQ10) 100 MG CAPS Take 100 mg by mouth daily      atorvastatin (LIPITOR) 20 MG tablet Take 20 mg by mouth daily      vitamin E 400 UNIT capsule Take 400 Units by mouth daily. Cholecalciferol (VITAMIN D3) 2000 UNITS CAPS Take  by mouth. Omega-3 Fatty Acids (OMEGA 3 PO) Take  by mouth. Biotin 5000 MCG CAPS Take  by mouth. therapeutic multivitamin-minerals (THERAGRAN-M) tablet Take 1 tablet by mouth daily. Calcium-Vitamin D (CALTRATE 600 PLUS-VIT D PO) Take 600 mg by mouth daily. ONETOUCH VERIO strip USE AS DIRECTED EVERY DAY (Patient not taking: Reported on 9/26/2022)       No facility-administered encounter medications on file as of 9/26/2022.       Current Outpatient Medications on File Prior to Visit   Medication Sig Dispense Refill    hydroCHLOROthiazide (HYDRODIURIL) 12.5 MG tablet Take 12.5 mg by mouth every morning      metFORMIN (GLUCOPHAGE) 1000 MG tablet TAKE 1 TABLET BY MOUTH TWICE A DAY      ibuprofen (ADVIL;MOTRIN) 200 MG tablet Take 200 mg by mouth every 6 hours as needed for Pain      potassium chloride (KLOR-CON M) 20 MEQ extended release tablet Take 1 tablet by mouth daily (Patient taking differently: Take 10 mEq by mouth daily) 30 tablet 5    predniSONE (DELTASONE) 10 MG tablet Take 10 mg by mouth nightly       metoprolol tartrate (LOPRESSOR) 25 MG tablet Take 25 mg by mouth daily /72   Pulse 77   Ht 5' 4\" (1.626 m)   Wt 187 lb (84.8 kg)   BMI 32.10 kg/m²       PHYSICAL EXAM:  Constitutional: Patient resting comfortably, in no acute distress. Neuro: Alert and oriented to person place and time. Cranial nerves grossly intact. Psych: Mood and affect normal.  Skin: Warm, dry  HEENT: normocephalic, atraumatic  Lymphatics: No palpable lymphadenopathy  Lungs: Respiratory effort normal, unlabored  Cardiovascular:  Normal peripheral pulses  Abdomen: Soft, non-tender, non-distended with no organomegaly or palpable masses. : No CVA tenderness. Bladder non-tender and not distended. Pelvic: deferred    Lab Results   Component Value Date    BUN 10 08/22/2020     Lab Results   Component Value Date    CREATININE 0.54 08/22/2020       ASSESSMENT:  This is a 66 y.o. female with the following diagnoses:   Diagnosis Orders   1. Renal calculus  XR ABDOMEN (KUB) (SINGLE AP VIEW)      2. Ureteral calculus  XR ABDOMEN (KUB) (SINGLE AP VIEW)             PLAN:  Patient will follow up with us in 1 year with a repeat KUB. We did discuss stone prevention from dietary standpoint.

## 2023-05-25 NOTE — PATIENT INSTRUCTIONS
Problem: At Risk for Falls  Goal: # Patient does not fall  Outcome: Outcome Met, Continue evaluating goal progress toward completion     Problem: Self Care Deficit  Goal: # Functional status is maintained or returned to baseline - REHAB ONLY  Outcome: Outcome Not Met, Continue to Monitor     Problem: Impaired Physical Mobility  Goal: # Bed mobility, ambulation, and ADLs are maintained or returned to baseline during hospitalization  Outcome: Outcome Not Met, Continue to Monitor     Problem: Activity Intolerance  Goal: # Tolerates activity for d/c setting with no clinical problems  Outcome: Outcome Not Met, Continue to Monitor     Problem: Nausea/Vomiting  Goal: Maintains oral intake with decreased/no reports of nausea/vomiting  Outcome: Outcome Met, Continue evaluating goal progress toward completion      To prevent future stone formation:  1) drink around 80 ounces of water per day  2) Avoid/minimize intake of \"bad fluids\" (soda pop, coffee, tea, alcohol, energy drinks)  3) reduce consumption of sodium/salt  4) reduce consumption of fatty animal protein (full-fat cheese/milk/dairy, red meats, pork). Limit protein intake to low-fat/lean options (low-fat dairy, fish, chicken, turkey)      SURVEY:    You may be receiving a survey from [employer] regarding your visit today. Please complete the survey to enable us to provide the highest quality of care to you and your family. If you cannot score us a very good on any question, please call the office to discuss how we could have made your experience a very good one. Thank you.   Zoila

## 2023-08-16 ENCOUNTER — HOSPITAL ENCOUNTER (OUTPATIENT)
Dept: WOMENS IMAGING | Age: 73
Discharge: HOME | End: 2023-08-18
Payer: MEDICARE

## 2023-08-16 DIAGNOSIS — Z12.39 SCREENING BREAST EXAMINATION: ICD-10-CM

## 2023-08-16 PROCEDURE — 77063 BREAST TOMOSYNTHESIS BI: CPT

## 2023-09-25 ENCOUNTER — HOSPITAL ENCOUNTER (OUTPATIENT)
Dept: GENERAL RADIOLOGY | Age: 73
Discharge: HOME | End: 2023-09-27
Payer: MEDICARE

## 2023-09-25 ENCOUNTER — HOSPITAL ENCOUNTER (OUTPATIENT)
Age: 73
Discharge: HOME | End: 2023-09-25
Payer: MEDICARE

## 2023-09-25 DIAGNOSIS — N20.1 URETERAL CALCULUS: ICD-10-CM

## 2023-09-25 DIAGNOSIS — N20.0 RENAL CALCULUS: ICD-10-CM

## 2023-09-25 PROCEDURE — 74018 RADEX ABDOMEN 1 VIEW: CPT

## 2023-09-28 ENCOUNTER — OFFICE VISIT (OUTPATIENT)
Dept: UROLOGY | Age: 73
End: 2023-09-28
Payer: MEDICARE

## 2023-09-28 VITALS
WEIGHT: 188 LBS | DIASTOLIC BLOOD PRESSURE: 90 MMHG | BODY MASS INDEX: 32.27 KG/M2 | SYSTOLIC BLOOD PRESSURE: 158 MMHG | TEMPERATURE: 97 F

## 2023-09-28 DIAGNOSIS — N20.0 RENAL CALCULUS: Primary | ICD-10-CM

## 2023-09-28 PROCEDURE — 1036F TOBACCO NON-USER: CPT | Performed by: NURSE PRACTITIONER

## 2023-09-28 PROCEDURE — G8399 PT W/DXA RESULTS DOCUMENT: HCPCS | Performed by: NURSE PRACTITIONER

## 2023-09-28 PROCEDURE — G8427 DOCREV CUR MEDS BY ELIG CLIN: HCPCS | Performed by: NURSE PRACTITIONER

## 2023-09-28 PROCEDURE — 1090F PRES/ABSN URINE INCON ASSESS: CPT | Performed by: NURSE PRACTITIONER

## 2023-09-28 PROCEDURE — 99213 OFFICE O/P EST LOW 20 MIN: CPT | Performed by: NURSE PRACTITIONER

## 2023-09-28 PROCEDURE — 3017F COLORECTAL CA SCREEN DOC REV: CPT | Performed by: NURSE PRACTITIONER

## 2023-09-28 PROCEDURE — 1123F ACP DISCUSS/DSCN MKR DOCD: CPT | Performed by: NURSE PRACTITIONER

## 2023-09-28 PROCEDURE — G8417 CALC BMI ABV UP PARAM F/U: HCPCS | Performed by: NURSE PRACTITIONER

## 2023-09-28 RX ORDER — METHOTREXATE 2.5 MG/1
12.5 TABLET ORAL WEEKLY
COMMUNITY
Start: 2023-09-12

## 2023-09-28 RX ORDER — FOLIC ACID 1 MG/1
1000 TABLET ORAL DAILY
COMMUNITY
Start: 2023-09-19

## 2023-09-28 ASSESSMENT — ENCOUNTER SYMPTOMS
BACK PAIN: 0
WHEEZING: 0
COLOR CHANGE: 0
SHORTNESS OF BREATH: 0
CONSTIPATION: 0
EYE REDNESS: 0
COUGH: 0
NAUSEA: 0
APNEA: 0
ABDOMINAL PAIN: 0
VOMITING: 0

## 2023-09-28 NOTE — PATIENT INSTRUCTIONS
To prevent future stone formation:  1) drink around 80 ounces of water per day  2) Avoid/minimize intake of \"bad fluids\" (soda pop, coffee, tea, alcohol, energy drinks)  3) reduce consumption of sodium/salt  4) reduce consumption of fatty animal protein (full-fat cheese/milk/dairy, red meats, pork). Limit protein intake to low-fat/lean options (low-fat dairy, fish, chicken, turkey)        Survey: You may be receiving a survey from [employer] regarding your visit today. Please complete the survey to enable us to provide the highest quality of care to you and your family.     If you cannot score us as very good on any question, please call the office to discuss how we could have major experience exceptional.    Thank you    Your Urology Care Team.

## 2023-11-07 ENCOUNTER — TELEPHONE (OUTPATIENT)
Dept: UROLOGY | Age: 73
End: 2023-11-07

## 2023-11-07 NOTE — TELEPHONE ENCOUNTER
Patient called to report that she recently had lithotripsy for a 9 mm stone. This was completed at the [facility] in [place].  She wanted our office to be aware

## 2024-06-24 ENCOUNTER — HOSPITAL ENCOUNTER (OUTPATIENT)
Age: 74
Discharge: HOME | End: 2024-06-24
Payer: MEDICARE

## 2024-06-24 ENCOUNTER — TELEPHONE (OUTPATIENT)
Dept: UROLOGY | Age: 74
End: 2024-06-24

## 2024-06-24 ENCOUNTER — HOSPITAL ENCOUNTER (OUTPATIENT)
Dept: CT IMAGING | Age: 74
Discharge: HOME | End: 2024-06-26
Payer: MEDICARE

## 2024-06-24 DIAGNOSIS — N23 RENAL COLIC: ICD-10-CM

## 2024-06-24 DIAGNOSIS — R10.31 RIGHT LOWER QUADRANT ABDOMINAL PAIN: ICD-10-CM

## 2024-06-24 DIAGNOSIS — N23 RENAL COLIC: Primary | ICD-10-CM

## 2024-06-24 LAB
ANION GAP SERPL CALCULATED.3IONS-SCNC: 9 MMOL/L (ref 9–17)
BASOPHILS # BLD: 0.06 K/UL (ref 0–0.2)
BASOPHILS NFR BLD: 1 % (ref 0–2)
BUN SERPL-MCNC: 19 MG/DL (ref 8–23)
BUN/CREAT SERPL: 27 (ref 9–20)
CALCIUM SERPL-MCNC: 10 MG/DL (ref 8.6–10.4)
CHLORIDE SERPL-SCNC: 101 MMOL/L (ref 98–107)
CO2 SERPL-SCNC: 28 MMOL/L (ref 20–31)
CREAT SERPL-MCNC: 0.7 MG/DL (ref 0.5–0.9)
EOSINOPHIL # BLD: 0.08 K/UL (ref 0–0.44)
EOSINOPHILS RELATIVE PERCENT: 1 % (ref 1–4)
ERYTHROCYTE [DISTWIDTH] IN BLOOD BY AUTOMATED COUNT: 13.5 % (ref 11.8–14.4)
GFR, ESTIMATED: >90 ML/MIN/1.73M2
GLUCOSE SERPL-MCNC: 91 MG/DL (ref 70–99)
HCT VFR BLD AUTO: 35.6 % (ref 36.3–47.1)
HGB BLD-MCNC: 11.5 G/DL (ref 11.9–15.1)
IMM GRANULOCYTES # BLD AUTO: <0.03 K/UL (ref 0–0.3)
IMM GRANULOCYTES NFR BLD: 0 %
LYMPHOCYTES NFR BLD: 3.03 K/UL (ref 1.1–3.7)
LYMPHOCYTES RELATIVE PERCENT: 39 % (ref 24–43)
MCH RBC QN AUTO: 31.3 PG (ref 25.2–33.5)
MCHC RBC AUTO-ENTMCNC: 32.3 G/DL (ref 28.4–34.8)
MCV RBC AUTO: 96.7 FL (ref 82.6–102.9)
MONOCYTES NFR BLD: 0.78 K/UL (ref 0.1–1.2)
MONOCYTES NFR BLD: 10 % (ref 3–12)
NEUTROPHILS NFR BLD: 49 % (ref 36–65)
NEUTS SEG NFR BLD: 3.89 K/UL (ref 1.5–8.1)
NRBC BLD-RTO: 0 PER 100 WBC
PLATELET # BLD AUTO: 281 K/UL (ref 138–453)
PMV BLD AUTO: 9.7 FL (ref 8.1–13.5)
POTASSIUM SERPL-SCNC: 4.7 MMOL/L (ref 3.7–5.3)
RBC # BLD AUTO: 3.68 M/UL (ref 3.95–5.11)
SODIUM SERPL-SCNC: 138 MMOL/L (ref 135–144)
WBC OTHER # BLD: 7.9 K/UL (ref 3.5–11.3)

## 2024-06-24 PROCEDURE — 87086 URINE CULTURE/COLONY COUNT: CPT

## 2024-06-24 PROCEDURE — 85025 COMPLETE CBC W/AUTO DIFF WBC: CPT

## 2024-06-24 PROCEDURE — 74176 CT ABD & PELVIS W/O CONTRAST: CPT

## 2024-06-24 PROCEDURE — 80048 BASIC METABOLIC PNL TOTAL CA: CPT

## 2024-06-24 PROCEDURE — 36415 COLL VENOUS BLD VENIPUNCTURE: CPT

## 2024-06-24 NOTE — TELEPHONE ENCOUNTER
Patient called office. She states she has an appointment 8/13/24 but she thinks she is having stone concerns. She had lithotripsy 11/2/23 at [facility] in [facility] then again 3/21/24 at [facility]. She states when stent was removed in March she was told there were a couple stones on right side still. Currently she is having low right flank pain. The way she feels she's afraid its stones. She is drinking over 100 oz of water a day. She is very uncomfortable but able to have some relief when laying in bed. She is using heating pad but not having much relief with that. She denies fever,nausea,vomiting. Please Advise

## 2024-06-24 NOTE — TELEPHONE ENCOUNTER
Writer called patient advised her of response. Patient states she has a hair appointment at 2 today and could get CT done afterwards. Writer forwarded her to scheduling to schedule CT STAT. She asked if we could follow up with her tomorrow to go over results. What time should we schedule patient? Please Advise thank you

## 2024-06-24 NOTE — TELEPHONE ENCOUNTER
Writer called patient confirming follow up appointment for tomorrow 3:30. Patient agreed to appointment time.

## 2024-06-25 ENCOUNTER — OFFICE VISIT (OUTPATIENT)
Dept: UROLOGY | Age: 74
End: 2024-06-25
Payer: MEDICARE

## 2024-06-25 VITALS
DIASTOLIC BLOOD PRESSURE: 70 MMHG | SYSTOLIC BLOOD PRESSURE: 140 MMHG | HEART RATE: 76 BPM | BODY MASS INDEX: 27.81 KG/M2 | WEIGHT: 162 LBS | TEMPERATURE: 97.5 F

## 2024-06-25 DIAGNOSIS — M54.50 ACUTE RIGHT-SIDED LOW BACK PAIN WITHOUT SCIATICA: ICD-10-CM

## 2024-06-25 DIAGNOSIS — N20.0 RENAL CALCULUS: Primary | ICD-10-CM

## 2024-06-25 PROCEDURE — 1123F ACP DISCUSS/DSCN MKR DOCD: CPT | Performed by: NURSE PRACTITIONER

## 2024-06-25 PROCEDURE — G8428 CUR MEDS NOT DOCUMENT: HCPCS | Performed by: NURSE PRACTITIONER

## 2024-06-25 PROCEDURE — G8399 PT W/DXA RESULTS DOCUMENT: HCPCS | Performed by: NURSE PRACTITIONER

## 2024-06-25 PROCEDURE — 1090F PRES/ABSN URINE INCON ASSESS: CPT | Performed by: NURSE PRACTITIONER

## 2024-06-25 PROCEDURE — G8417 CALC BMI ABV UP PARAM F/U: HCPCS | Performed by: NURSE PRACTITIONER

## 2024-06-25 PROCEDURE — 3017F COLORECTAL CA SCREEN DOC REV: CPT | Performed by: NURSE PRACTITIONER

## 2024-06-25 PROCEDURE — 1036F TOBACCO NON-USER: CPT | Performed by: NURSE PRACTITIONER

## 2024-06-25 PROCEDURE — 99214 OFFICE O/P EST MOD 30 MIN: CPT | Performed by: NURSE PRACTITIONER

## 2024-06-25 NOTE — PROGRESS NOTES
History  8/2021 Left HLL    11/2023 lithotripsy at [facility] in [facility]     3/2024 lithotripsy at [facility]    Today  Here today to follow-up for stones. She did call with RLQ pain.  She denies abdominal pain today, and explains this is all right back pain.  She denies any episodes of spontaneous stone passage.  She denies any flank or abdominal pain, dysuria or gross hematuria.  She did have a CT completed prior to today's visit.  This film was independently reviewed and shows stable left renal stones.  I do not identify any obvious stones on the right.  There is no hydroureteronephrosis.  The radiologist notes that there is diverticulosis, but no acute diverticulitis.  Labs are normal.  Urine culture is pending.    Exam: Reproducible pain to right lumbar spine paraspinal muscle.  Upon further questioning her pain is worse after sitting or lying down and improves with movement    Plan  I explained that her pain is consistent with musculoskeletal type of pain.  She should follow-up with her primary care for this and/or physical therapy    I offered definitive stone therapy for the stones on the left.  She would like to observe these.    We reviewed stone prevention diet    Follow-up in 1 year or sooner if needed

## 2024-06-25 NOTE — PATIENT INSTRUCTIONS
To prevent future stone formation:  1) drink around  ounces of water per day  2) Avoid/minimize intake of \"bad fluids\" (soda pop, coffee, tea, alcohol, energy drinks)  3) reduce consumption of sodium/salt  4) reduce consumption of fatty animal protein (full-fat cheese/milk/dairy, red meats, pork). Limit protein intake to low-fat/lean options (low-fat dairy, fish, chicken, turkey). Eat 3 servings of protein every day. 1 serving = 3-4 ounces       SURVEY:    You may be receiving a survey from [employer] regarding your visit today.    Please complete the survey to enable us to provide the highest quality of care to you and your family.    If you cannot score us a very good on any question, please call the office to discuss how we could have made your experience a very good one.    Thank you.

## 2024-06-26 ENCOUNTER — TELEPHONE (OUTPATIENT)
Dept: UROLOGY | Age: 74
End: 2024-06-26

## 2024-06-26 LAB
MICROORGANISM SPEC CULT: NORMAL
SERVICE CMNT-IMP: NORMAL
SPECIMEN DESCRIPTION: NORMAL

## 2024-06-26 NOTE — TELEPHONE ENCOUNTER
----- Message from TERRENCE Rosales - CNP sent at 6/26/2024  8:04 AM EDT -----  Call pt - urine cx reviewed and negative for UTI

## 2024-07-11 ENCOUNTER — APPOINTMENT (OUTPATIENT)
Dept: CT IMAGING | Age: 74
End: 2024-07-11
Payer: MEDICARE

## 2024-07-11 ENCOUNTER — HOSPITAL ENCOUNTER (EMERGENCY)
Age: 74
Discharge: HOME | End: 2024-07-11
Payer: MEDICARE

## 2024-07-11 VITALS
RESPIRATION RATE: 24 BRPM | HEART RATE: 65 BPM | HEIGHT: 64 IN | TEMPERATURE: 97.7 F | WEIGHT: 160 LBS | OXYGEN SATURATION: 95 % | BODY MASS INDEX: 27.31 KG/M2 | DIASTOLIC BLOOD PRESSURE: 52 MMHG | SYSTOLIC BLOOD PRESSURE: 158 MMHG

## 2024-07-11 DIAGNOSIS — R42 DIZZINESS: Primary | ICD-10-CM

## 2024-07-11 DIAGNOSIS — R42 VERTIGO: ICD-10-CM

## 2024-07-11 LAB
ALBUMIN SERPL-MCNC: 4.3 G/DL (ref 3.5–5.2)
ALBUMIN/GLOB SERPL: 1.3 {RATIO} (ref 1–2.5)
ALP SERPL-CCNC: 88 U/L (ref 35–104)
ALT SERPL-CCNC: 16 U/L (ref 5–33)
ANION GAP SERPL CALCULATED.3IONS-SCNC: 10 MMOL/L (ref 9–17)
AST SERPL-CCNC: 19 U/L
BACTERIA URNS QL MICRO: ABNORMAL
BASOPHILS # BLD: 0.03 K/UL (ref 0–0.2)
BASOPHILS NFR BLD: 0 % (ref 0–2)
BILIRUB SERPL-MCNC: 0.8 MG/DL (ref 0.3–1.2)
BILIRUB UR QL STRIP: NEGATIVE
BUN SERPL-MCNC: 15 MG/DL (ref 8–23)
BUN/CREAT SERPL: 25 (ref 9–20)
CALCIUM SERPL-MCNC: 9.9 MG/DL (ref 8.6–10.4)
CHLORIDE SERPL-SCNC: 103 MMOL/L (ref 98–107)
CLARITY UR: CLEAR
CO2 SERPL-SCNC: 26 MMOL/L (ref 20–31)
COLOR UR: YELLOW
CREAT SERPL-MCNC: 0.6 MG/DL (ref 0.5–0.9)
EOSINOPHIL # BLD: 0.08 K/UL (ref 0–0.44)
EOSINOPHILS RELATIVE PERCENT: 1 % (ref 1–4)
EPI CELLS #/AREA URNS HPF: ABNORMAL /HPF (ref 0–25)
ERYTHROCYTE [DISTWIDTH] IN BLOOD BY AUTOMATED COUNT: 13.2 % (ref 11.8–14.4)
GFR, ESTIMATED: >90 ML/MIN/1.73M2
GLUCOSE SERPL-MCNC: 128 MG/DL (ref 70–99)
GLUCOSE UR STRIP-MCNC: NEGATIVE MG/DL
HCT VFR BLD AUTO: 34.9 % (ref 36.3–47.1)
HGB BLD-MCNC: 11.7 G/DL (ref 11.9–15.1)
HGB UR QL STRIP.AUTO: NEGATIVE
IMM GRANULOCYTES # BLD AUTO: 0.03 K/UL (ref 0–0.3)
IMM GRANULOCYTES NFR BLD: 0 %
KETONES UR STRIP-MCNC: NEGATIVE MG/DL
LEUKOCYTE ESTERASE UR QL STRIP: ABNORMAL
LYMPHOCYTES NFR BLD: 2.76 K/UL (ref 1.1–3.7)
LYMPHOCYTES RELATIVE PERCENT: 33 % (ref 24–43)
MCH RBC QN AUTO: 32 PG (ref 25.2–33.5)
MCHC RBC AUTO-ENTMCNC: 33.5 G/DL (ref 28.4–34.8)
MCV RBC AUTO: 95.4 FL (ref 82.6–102.9)
MONOCYTES NFR BLD: 0.73 K/UL (ref 0.1–1.2)
MONOCYTES NFR BLD: 9 % (ref 3–12)
NEUTROPHILS NFR BLD: 57 % (ref 36–65)
NEUTS SEG NFR BLD: 4.75 K/UL (ref 1.5–8.1)
NITRITE UR QL STRIP: NEGATIVE
NRBC BLD-RTO: 0 PER 100 WBC
PH UR STRIP: 7 [PH] (ref 5–9)
PLATELET # BLD AUTO: 273 K/UL (ref 138–453)
PMV BLD AUTO: 9.7 FL (ref 8.1–13.5)
POTASSIUM SERPL-SCNC: 4.5 MMOL/L (ref 3.7–5.3)
PROT SERPL-MCNC: 7.5 G/DL (ref 6.4–8.3)
PROT UR STRIP-MCNC: NEGATIVE MG/DL
RBC # BLD AUTO: 3.66 M/UL (ref 3.95–5.11)
RBC #/AREA URNS HPF: ABNORMAL /HPF (ref 0–2)
SODIUM SERPL-SCNC: 139 MMOL/L (ref 135–144)
SP GR UR STRIP: 1.01 (ref 1.01–1.02)
TROPONIN I SERPL HS-MCNC: 7 NG/L (ref 0–14)
UROBILINOGEN UR STRIP-ACNC: NORMAL EU/DL (ref 0–1)
WBC #/AREA URNS HPF: ABNORMAL /HPF (ref 0–5)
WBC OTHER # BLD: 8.4 K/UL (ref 3.5–11.3)

## 2024-07-11 PROCEDURE — 81001 URINALYSIS AUTO W/SCOPE: CPT

## 2024-07-11 PROCEDURE — 96374 THER/PROPH/DIAG INJ IV PUSH: CPT

## 2024-07-11 PROCEDURE — 80053 COMPREHEN METABOLIC PANEL: CPT

## 2024-07-11 PROCEDURE — 85025 COMPLETE CBC W/AUTO DIFF WBC: CPT

## 2024-07-11 PROCEDURE — 84484 ASSAY OF TROPONIN QUANT: CPT

## 2024-07-11 PROCEDURE — 6370000000 HC RX 637 (ALT 250 FOR IP): Performed by: PHYSICIAN ASSISTANT

## 2024-07-11 PROCEDURE — 93005 ELECTROCARDIOGRAM TRACING: CPT | Performed by: EMERGENCY MEDICINE

## 2024-07-11 PROCEDURE — 2580000003 HC RX 258: Performed by: PHYSICIAN ASSISTANT

## 2024-07-11 PROCEDURE — 70450 CT HEAD/BRAIN W/O DYE: CPT

## 2024-07-11 PROCEDURE — 99284 EMERGENCY DEPT VISIT MOD MDM: CPT

## 2024-07-11 PROCEDURE — 6360000002 HC RX W HCPCS: Performed by: PHYSICIAN ASSISTANT

## 2024-07-11 PROCEDURE — 96375 TX/PRO/DX INJ NEW DRUG ADDON: CPT

## 2024-07-11 RX ORDER — ONDANSETRON 2 MG/ML
4 INJECTION INTRAMUSCULAR; INTRAVENOUS ONCE
Status: COMPLETED | OUTPATIENT
Start: 2024-07-11 | End: 2024-07-11

## 2024-07-11 RX ORDER — DIPHENHYDRAMINE HYDROCHLORIDE 50 MG/ML
25 INJECTION INTRAMUSCULAR; INTRAVENOUS ONCE
Status: COMPLETED | OUTPATIENT
Start: 2024-07-11 | End: 2024-07-11

## 2024-07-11 RX ORDER — 0.9 % SODIUM CHLORIDE 0.9 %
1000 INTRAVENOUS SOLUTION INTRAVENOUS ONCE
Status: COMPLETED | OUTPATIENT
Start: 2024-07-11 | End: 2024-07-11

## 2024-07-11 RX ORDER — MECLIZINE HCL 12.5 MG/1
12.5 TABLET ORAL 3 TIMES DAILY PRN
Qty: 15 TABLET | Refills: 0 | Status: SHIPPED | OUTPATIENT
Start: 2024-07-11 | End: 2024-07-21

## 2024-07-11 RX ORDER — MECLIZINE HCL 12.5 MG/1
25 TABLET ORAL ONCE
Status: COMPLETED | OUTPATIENT
Start: 2024-07-11 | End: 2024-07-11

## 2024-07-11 RX ORDER — ONDANSETRON 4 MG/1
4 TABLET, FILM COATED ORAL 3 TIMES DAILY PRN
Qty: 15 TABLET | Refills: 0 | Status: SHIPPED | OUTPATIENT
Start: 2024-07-11

## 2024-07-11 RX ADMIN — ONDANSETRON 4 MG: 2 INJECTION INTRAMUSCULAR; INTRAVENOUS at 18:48

## 2024-07-11 RX ADMIN — SODIUM CHLORIDE 1000 ML: 9 INJECTION, SOLUTION INTRAVENOUS at 18:47

## 2024-07-11 RX ADMIN — DIPHENHYDRAMINE HYDROCHLORIDE 25 MG: 50 INJECTION INTRAMUSCULAR; INTRAVENOUS at 18:48

## 2024-07-11 RX ADMIN — MECLIZINE 25 MG: 12.5 TABLET ORAL at 21:05

## 2024-07-11 ASSESSMENT — VISUAL ACUITY: OU: 1

## 2024-07-11 NOTE — ED PROVIDER NOTES
[facility] ED  EMERGENCY DEPARTMENT ENCOUNTER      Pt Name: Tracy Denton  MRN: [id number]  Birthdate 1950  Date of evaluation: 7/11/2024  Provider: Miguelito Vizcaino PA-C  9:32 PM    CHIEF COMPLAINT       Chief Complaint   Patient presents with    Dizziness     Onset this AM with dizziness. States she has had to hold on to surrounding objects for balance. Dizziness is intermittent. BPs were  190s systolically at home.          HISTORY OF PRESENT ILLNESS    Tracy Denton is a 73 y.o. female who presents to the emergency department with her  and complaints of intermittent dizziness which started while she was lying in bed this morning he states that she had then gotten up and while going to the restroom things got worse and the room was sorters with spinning.  She was able to lay back down for a while and then got up again denies any headache sore throat fever or recent illness.  Patient did have recent kidney stone this past week, but denies any urinary symptoms associated with her episode today.  She was then able to get up and eat breakfast and actually was able to read a book and then noticed position change and movement that she had another episode.  Her  encouraged her to come to the ER early this morning but she declined.  After the fourth episode which she had bent over to get some stuff out of the dryer she had to run to the sink and vomited.  She denies any chest pain associated with her symptoms or shortness of breath.  She denies any hearing changes or ringing in her ears.      The history is provided by the patient and the spouse. No  was used.       Nursing Notes were reviewed.    REVIEW OF SYSTEMS       Review of Systems    Except as noted above the remainder of the review of systems was reviewed and negative.       PAST MEDICAL HISTORY     Past Medical History:   Diagnosis Date    Acid reflux     Arthritis     Diabetes mellitus (HCC)     High cholesterol

## 2024-07-12 LAB
EKG ATRIAL RATE: 60 BPM
EKG P AXIS: 48 DEGREES
EKG P-R INTERVAL: 160 MS
EKG Q-T INTERVAL: 436 MS
EKG QRS DURATION: 98 MS
EKG QTC CALCULATION (BAZETT): 436 MS
EKG R AXIS: -6 DEGREES
EKG T AXIS: 30 DEGREES
EKG VENTRICULAR RATE: 60 BPM

## 2024-07-12 PROCEDURE — 93010 ELECTROCARDIOGRAM REPORT: CPT | Performed by: INTERNAL MEDICINE

## 2024-08-30 ENCOUNTER — HOSPITAL ENCOUNTER (EMERGENCY)
Age: 74
Discharge: HOME | End: 2024-08-30
Attending: EMERGENCY MEDICINE
Payer: MEDICARE

## 2024-08-30 ENCOUNTER — APPOINTMENT (OUTPATIENT)
Dept: CT IMAGING | Age: 74
End: 2024-08-30
Payer: MEDICARE

## 2024-08-30 VITALS
RESPIRATION RATE: 12 BRPM | WEIGHT: 161 LBS | BODY MASS INDEX: 27.64 KG/M2 | SYSTOLIC BLOOD PRESSURE: 135 MMHG | DIASTOLIC BLOOD PRESSURE: 66 MMHG | OXYGEN SATURATION: 96 % | TEMPERATURE: 97.5 F | HEART RATE: 84 BPM

## 2024-08-30 DIAGNOSIS — M51.36 BULGING OF LUMBAR INTERVERTEBRAL DISC: Primary | ICD-10-CM

## 2024-08-30 DIAGNOSIS — M54.50 LUMBAR PAIN: ICD-10-CM

## 2024-08-30 PROCEDURE — 99284 EMERGENCY DEPT VISIT MOD MDM: CPT

## 2024-08-30 PROCEDURE — 72131 CT LUMBAR SPINE W/O DYE: CPT

## 2024-08-30 RX ORDER — METHYLPREDNISOLONE 4 MG
TABLET, DOSE PACK ORAL
Qty: 1 KIT | Refills: 0 | Status: SHIPPED | OUTPATIENT
Start: 2024-08-30 | End: 2024-09-05

## 2024-08-30 ASSESSMENT — PAIN DESCRIPTION - LOCATION: LOCATION: BACK

## 2024-08-30 ASSESSMENT — PAIN DESCRIPTION - ORIENTATION: ORIENTATION: RIGHT;LOWER

## 2024-08-30 ASSESSMENT — PAIN - FUNCTIONAL ASSESSMENT: PAIN_FUNCTIONAL_ASSESSMENT: 0-10

## 2024-08-30 ASSESSMENT — PAIN DESCRIPTION - DESCRIPTORS: DESCRIPTORS: DULL

## 2024-08-30 ASSESSMENT — PAIN DESCRIPTION - PAIN TYPE: TYPE: ACUTE PAIN

## 2024-08-30 ASSESSMENT — PAIN DESCRIPTION - FREQUENCY: FREQUENCY: CONTINUOUS

## 2024-08-30 ASSESSMENT — PAIN SCALES - GENERAL: PAINLEVEL_OUTOF10: 3

## 2024-08-30 NOTE — ED TRIAGE NOTES
Lower back pain with difficulty walking onset Wednesday. OTC meds not effective. Pt states she's been applying heat. Denies injuries or urinary sx.

## 2024-08-30 NOTE — DISCHARGE INSTRUCTIONS
Please follow-up with Dr. Madrigal to discuss additional pain management options and imaging.    Additionally, consider following up with the pain management physician as directed.    Return to the ED for worsening pain, changes in strength or sensation, changes in bowel or bladder function or any other concerns.

## 2024-08-31 NOTE — ED PROVIDER NOTES
[facility]  EMERGENCY DEPARTMENT ENCOUNTER      Pt Name: Tracy Denton  MRN: [id number]  Birthdate 1950  Date of evaluation: 8/30/2024  Provider: Ran Keenan MD    CHIEF COMPLAINT       Chief Complaint   Patient presents with    Back Pain         HISTORY OF PRESENT ILLNESS      Tracy Denton is a 73 y.o. female who presents to the emergency department for evaluation of low back pain.  States that it has been ongoing for about 2 days.  No trauma.  Tylenol has not improved the pain at all.  States that she gets minimal improvement with ibuprofen.    She does have a history of kidney stones but notes that the pain is distinctly different.  Today, the pain is located in the low back, near the SI joint on the right side with some radiation around the right buttock.  Denies any radiation to the right leg.  There is no change in strength or sensation.  No change in bowel or bladder function.    Denies any fever.  No abdominal pain, nausea or vomiting.  No recent unintentional weight loss.  No urinary complaints today.  She has no other complaints at this time.      PAST MEDICAL HISTORY     Past Medical History:   Diagnosis Date    Acid reflux     Arthritis     Diabetes mellitus (HCC)     High cholesterol     High triglycerides     Hypertension     Inflammatory polyarthritis (HCC)     Kidney stone          SURGICAL HISTORY       Past Surgical History:   Procedure Laterality Date    CATARACT REMOVAL Right 08/20/2018    with Dr. Gordillo    COLONOSCOPY  2009    COLONOSCOPY  08/07/2020    [place]    CYSTOSCOPY Left     hll with stent    CYSTOSCOPY Left 08/27/2020    CYSTOSCOPY URETEROSCOPY LASER-HLL performed by Bradley Pineda MD at [facility] OR    CYSTOSCOPY INSERTION / REMOVAL STENT / STONE Left 08/21/2020    CYSTOSCOPY  STENT INSERTION performed by Bradley Pineda MD at [facility] OR    CYSTOSCOPY INSERTION / REMOVAL STENT / STONE Left 08/27/2020    CYSTOSCOPY  STENT INSERTION-EXCHANGE performed by Bradley Pineda MD at  [id number]  [phone]    Schedule an appointment as soon as possible for a visit   Re-evaluation; Consideration of additional imaging (MRI)    Adalberto White MD  [address] [facility] [address]  [phone]    Schedule an appointment as soon as possible for a visit         DISCHARGE MEDICATIONS:  Discharge Medication List as of 8/30/2024  2:41 PM        START taking these medications    Details   methylPREDNISolone (MEDROL, KAY,) 4 MG tablet Take by mouth., Disp-1 kit, R-0Normal             (Please note that portions of this note were completed with a voice recognition program.  Efforts were made to edit the dictations but occasionally words are mis-transcribed.)    Ran Keenan MD (electronically signed)  Attending Emergency Physician            Ran Keenan MD  08/31/24 [id number]

## 2024-09-04 ENCOUNTER — HOSPITAL ENCOUNTER (OUTPATIENT)
Dept: WOMENS IMAGING | Age: 74
Discharge: HOME | End: 2024-09-06
Payer: MEDICARE

## 2024-09-04 VITALS — WEIGHT: 163 LBS | BODY MASS INDEX: 27.83 KG/M2 | HEIGHT: 64 IN

## 2024-09-04 DIAGNOSIS — Z12.31 VISIT FOR SCREENING MAMMOGRAM: ICD-10-CM

## 2024-09-04 PROCEDURE — 77063 BREAST TOMOSYNTHESIS BI: CPT

## (undated) DEVICE — GOWN,AURORA,NON-REINFORCED,2XL: Brand: MEDLINE

## (undated) DEVICE — SYRINGE MED 50ML LUERLOCK TIP

## (undated) DEVICE — Z DISCONTINUED BY MEDLINE USE 2711682 TRAY SKIN PREP DRY W/ PREM GLV

## (undated) DEVICE — SOLUTION IV IRRIG WATER 1000ML POUR BRL 2F7114

## (undated) DEVICE — Z INACTIVE USE 2660664 SOLUTION IRRIG 3000ML 0.9% SOD CHL USP UROMATIC PLAS CONT

## (undated) DEVICE — DUAL LUMEN URETERAL CATHETER

## (undated) DEVICE — GLOVE ORANGE PI 7 1/2   MSG9075

## (undated) DEVICE — KNIFE OPHTH DIA22MM 45DEG SLT W HNDL SHRP ANG PNT DEL DBL

## (undated) DEVICE — Device

## (undated) DEVICE — SOLUTION IV IRRIG POUR BRL 0.9% SODIUM CHL 2F7124

## (undated) DEVICE — SYRINGE MED 20ML STD CLR PLAS LUERLOCK TIP N CTRL DISP

## (undated) DEVICE — Z DUP USE 2139333 GUIDEWIRE UROLOGICAL STR STD 0.035 IN X150 CM REG ZIPWIRE LF

## (undated) DEVICE — SPONGE GZ W4XL4IN CELOS POSTOP AVANT

## (undated) DEVICE — SOFT SHIELD® COLLAGEN SHIELD, 12 HOURS (CE): Brand: SOFT SHIELD® COLLAGEN SHIELDS

## (undated) DEVICE — STRIP,CLOSURE,WOUND,MEDI-STRIP,1/2X4: Brand: MEDLINE

## (undated) DEVICE — 20 ML SYRINGE LUER-LOCK TIP: Brand: MONOJECT

## (undated) DEVICE — CONTROL SYRINGE LUER-LOCK TIP: Brand: MONOJECT

## (undated) DEVICE — Device: Brand: ALLEGRO 1X SILICONE I/A HANDPIECE (6)

## (undated) DEVICE — BETADINE 5% EYE SOL

## (undated) DEVICE — SOLUTION SURG PREP ANTIMICROBIAL 4 OZ SKIN WND EXIDINE

## (undated) DEVICE — ADAPTER URO SCP UROLOK LL

## (undated) DEVICE — MASTISOL ADHESIVE LIQ 2/3ML

## (undated) DEVICE — AIRLIFE™ NASAL OXYGEN CANNULA CURVED, FLARED TIP, WITH 7 FEET (2.1 M) CRUSH RESISTANT TUBING, OVER-THE-EAR STYLE: Brand: AIRLIFE™

## (undated) DEVICE — SINGLE ACTION PUMPING SYSTEM

## (undated) DEVICE — GUIDEWIRE VASC STR 3 CM 0.035 INX150 CM STD NIT ZIPWIRE